# Patient Record
Sex: FEMALE | HISPANIC OR LATINO | Employment: PART TIME | URBAN - METROPOLITAN AREA
[De-identification: names, ages, dates, MRNs, and addresses within clinical notes are randomized per-mention and may not be internally consistent; named-entity substitution may affect disease eponyms.]

---

## 2022-11-16 ENCOUNTER — GYNECOLOGY VISIT (OUTPATIENT)
Dept: OBGYN | Facility: CLINIC | Age: 42
End: 2022-11-16

## 2022-11-16 VITALS
HEIGHT: 67 IN | BODY MASS INDEX: 34.06 KG/M2 | WEIGHT: 217 LBS | DIASTOLIC BLOOD PRESSURE: 62 MMHG | SYSTOLIC BLOOD PRESSURE: 112 MMHG

## 2022-11-16 DIAGNOSIS — N93.8 DUB (DYSFUNCTIONAL UTERINE BLEEDING): ICD-10-CM

## 2022-11-16 DIAGNOSIS — O09.521 ADVANCED MATERNAL AGE IN MULTIGRAVIDA, FIRST TRIMESTER: ICD-10-CM

## 2022-11-16 LAB
INT CON NEG: NEGATIVE
INT CON POS: POSITIVE
POC URINE PREGNANCY TEST: POSITIVE

## 2022-11-16 PROCEDURE — 81025 URINE PREGNANCY TEST: CPT | Performed by: PHYSICIAN ASSISTANT

## 2022-11-16 PROCEDURE — 99203 OFFICE O/P NEW LOW 30 MIN: CPT | Performed by: PHYSICIAN ASSISTANT

## 2022-11-16 ASSESSMENT — ENCOUNTER SYMPTOMS
NAUSEA: 0
HEADACHES: 1
VOMITING: 0
DEPRESSION: 0

## 2022-11-16 NOTE — PROGRESS NOTES
Patient here for GYN/DUB  LMP: 8/20/2022  MIRIAM: 5/27/2022  GA: 12w4d  Last pap: 2 yrs ago   Pt states having headaches and some cramping.   UPT positive

## 2022-11-16 NOTE — PROGRESS NOTES
"Subjective     Deneen Richard is a 42 y.o. female who presents with Gynecologic Exam (DUB)  Pt is somewhat sure of LMP. Dating is by US c/w LMP today, only 4 days different.   OBHx: Hx 3 term  without GDM, PIH or delivery complications, though last was .   PMHx: Denies  SHX: Denies  Allergies: NKDA  Social: Denies tob, etoh or drug use   Meds; Taking PNV only  Pt currently denies cramping, bleeding or pain though pt has had HA and mild cramping in lower abd - pt only drinking 2 bottles water daily, so urged to incr water intake. No FM.             HPI    Review of Systems   Gastrointestinal:  Negative for nausea and vomiting.   Neurological:  Positive for headaches.   Psychiatric/Behavioral:  Negative for depression.    All other systems reviewed and are negative.           Objective     /62   Ht 5' 7\"   Wt 217 lb   LMP 2022   BMI 33.99 kg/m²      Physical Exam  Vitals reviewed.   Constitutional:       Appearance: She is well-developed.   HENT:      Head: Normocephalic and atraumatic.   Eyes:      Pupils: Pupils are equal, round, and reactive to light.   Neck:      Thyroid: No thyromegaly.   Cardiovascular:      Rate and Rhythm: Normal rate and regular rhythm.      Heart sounds: Normal heart sounds.   Pulmonary:      Effort: Pulmonary effort is normal. No respiratory distress.      Breath sounds: Normal breath sounds.   Abdominal:      General: Bowel sounds are normal. There is no distension.      Palpations: Abdomen is soft.      Tenderness: There is no abdominal tenderness.   Genitourinary:     Exam position: Supine.      Uterus: Enlarged (Gravid, uterus c/w 12-13 wk size). Not deviated and not tender.    Musculoskeletal:      Cervical back: Normal range of motion and neck supple.   Skin:     General: Skin is warm and dry.      Findings: No erythema.   Neurological:      Mental Status: She is alert.      Deep Tendon Reflexes: Reflexes are normal and symmetric.   Psychiatric:    "      Behavior: Behavior normal.         Thought Content: Thought content normal.             BSUS done with single viable IUP, CRL c/w 13w1d, MIRIAM 5/23/23, +FHT at 155bpm, +FM. I would recommend keeping MIRIAM based on LMP of 5/27/23, as only 4 days different, would not change by guidelines.               Assessment & Plan        1. DUB (dysfunctional uterine bleeding)  - NOB visit 1-2 wks  - POCT Pregnancy    2. Advanced maternal age in multigravida, first trimester  - D/w pt risks and likely need/recommendation for NIPT next visit, pt receptive

## 2022-11-17 ENCOUNTER — APPOINTMENT (OUTPATIENT)
Dept: OBGYN | Facility: CLINIC | Age: 42
End: 2022-11-17

## 2022-12-05 ENCOUNTER — INITIAL PRENATAL (OUTPATIENT)
Dept: OBGYN | Facility: CLINIC | Age: 42
End: 2022-12-05

## 2022-12-05 ENCOUNTER — HOSPITAL ENCOUNTER (OUTPATIENT)
Facility: MEDICAL CENTER | Age: 42
End: 2022-12-05
Payer: MEDICAID

## 2022-12-05 VITALS
SYSTOLIC BLOOD PRESSURE: 110 MMHG | WEIGHT: 217.4 LBS | DIASTOLIC BLOOD PRESSURE: 66 MMHG | HEIGHT: 65 IN | BODY MASS INDEX: 36.22 KG/M2

## 2022-12-05 DIAGNOSIS — O09.522 AMA (ADVANCED MATERNAL AGE) MULTIGRAVIDA 35+, SECOND TRIMESTER: ICD-10-CM

## 2022-12-05 DIAGNOSIS — Z87.59 HISTORY OF DELIVERY OF MACROSOMAL INFANT: ICD-10-CM

## 2022-12-05 DIAGNOSIS — O09.892 SUPERVISION OF OTHER HIGH RISK PREGNANCIES, SECOND TRIMESTER: ICD-10-CM

## 2022-12-05 PROBLEM — N93.8 DUB (DYSFUNCTIONAL UTERINE BLEEDING): Status: RESOLVED | Noted: 2022-11-16 | Resolved: 2022-12-05

## 2022-12-05 LAB
APPEARANCE UR: CLEAR
BILIRUB UR STRIP-MCNC: NORMAL MG/DL
COLOR UR AUTO: NORMAL
GLUCOSE UR STRIP.AUTO-MCNC: NEGATIVE MG/DL
KETONES UR STRIP.AUTO-MCNC: NEGATIVE MG/DL
LEUKOCYTE ESTERASE UR QL STRIP.AUTO: NEGATIVE
NITRITE UR QL STRIP.AUTO: NEGATIVE
PH UR STRIP.AUTO: 5.5 [PH] (ref 5–8)
PROT UR QL STRIP: NEGATIVE MG/DL
RBC UR QL AUTO: NORMAL
SP GR UR STRIP.AUTO: 1.03
UROBILINOGEN UR STRIP-MCNC: NORMAL MG/DL

## 2022-12-05 PROCEDURE — 90471 IMMUNIZATION ADMIN: CPT

## 2022-12-05 PROCEDURE — 90686 IIV4 VACC NO PRSV 0.5 ML IM: CPT

## 2022-12-05 PROCEDURE — 81002 URINALYSIS NONAUTO W/O SCOPE: CPT

## 2022-12-05 PROCEDURE — 0500F INITIAL PRENATAL CARE VISIT: CPT

## 2022-12-05 ASSESSMENT — EDINBURGH POSTNATAL DEPRESSION SCALE (EPDS)
I HAVE BEEN SO UNHAPPY THAT I HAVE BEEN CRYING: NO, NEVER
TOTAL SCORE: 6
THE THOUGHT OF HARMING MYSELF HAS OCCURRED TO ME: NEVER
I HAVE BLAMED MYSELF UNNECESSARILY WHEN THINGS WENT WRONG: YES, MOST OF THE TIME
I HAVE BEEN ABLE TO LAUGH AND SEE THE FUNNY SIDE OF THINGS: AS MUCH AS I ALWAYS COULD
I HAVE FELT SCARED OR PANICKY FOR NO GOOD REASON: NO, NOT AT ALL
THINGS HAVE BEEN GETTING ON TOP OF ME: YES, MOST OF THE TIME I HAVEN'T BEEN ABLE TO COPE AT ALL
I HAVE FELT SAD OR MISERABLE: NO, NOT AT ALL
I HAVE BEEN ANXIOUS OR WORRIED FOR NO GOOD REASON: NO, NOT AT ALL
I HAVE BEEN SO UNHAPPY THAT I HAVE HAD DIFFICULTY SLEEPING: NOT AT ALL
I HAVE LOOKED FORWARD WITH ENJOYMENT TO THINGS: AS MUCH AS I EVER DID

## 2022-12-05 NOTE — PROGRESS NOTES
NOB visit  LMP: 08/20/2022  Pt had US   WT: 217.4 lb  BP: 110/66  Preferred pharmacy vitrified with pt.  Desires the influenza   Desires AMA Counseling  Pt desires AFP Screening   Pt states having sometimes she gets lower abdominal cramping and urge to void. States no other complaints or concerns today  Last pap: 2 yrs Negative   Good # [phone]

## 2022-12-05 NOTE — PROGRESS NOTES
"CC: New Ob visit     Subjective Deneen Richard  15w2d by LMP= 13w1d US presents for prenatal care. Today is her first prenatal visit at [facility].   I have reviewed the patients' medical, surgical, gynecological, obstetrical, social, and family histories, medications and available lab results and pertinent notes are as follows:   No ER visits  No previous care in this pregnancy.   She has complaints of cramping and some urinary urgency and frequency today.    Genetic screening options: Desires AFP.  Declines carrier screening. Declines NIPTs at this time but would consider it if Dr. Hinson recommends.     Reports no FM. Denies VB, LOF, or cramping.  Denies dysuria, vaginal DC.    Pt is unmarried and lives with FOB \"Hal\" and older kids. New FOB, older kids with prior partner. FOB is involved and supportive. She is not currently working outside the home, no heavy lifting, no chemical exposure.    Pregnancy is planned and desired.      OB History    Para Term  AB Living   4 3 3 0 0 3   SAB IAB Ectopic Molar Multiple Live Births   0 0 0 0 0 3       Past Gynecological History:  Denies fibroids, ovarian cysts, abnormal pap smears, STDs. She denies ever having surgery on her cervix, uterus, or ovaries in the past.    Last pap smear was 2 years ago with no hx abnormals, no records available so repeat today  Past OB hx includes 3 prior term NSVDs, 1 baby 8lb 15oz.   History of HSV I or II in self or partner: no  History of STIs in self or partner: no  History of Thyroid problems: no    History of depression or anxiety no      Past Medical History:   Diagnosis Date    GERD (gastroesophageal reflux disease)     Head ache      History reviewed. No pertinent surgical history.   Social History     Socioeconomic History    Marital status: Single     Spouse name: Not on file    Number of children: Not on file    Years of education: Not on file    Highest education level: Not on file "   Occupational History    Not on file   Tobacco Use    Smoking status: Never    Smokeless tobacco: Never   Vaping Use    Vaping Use: Never used   Substance and Sexual Activity    Alcohol use: Not Currently    Drug use: Never    Sexual activity: Yes     Partners: Male     Birth control/protection: None     Comment: not . planned pregnancy   Other Topics Concern    Not on file   Social History Narrative    Not on file     Social Determinants of Health     Financial Resource Strain: Not on file   Food Insecurity: Not on file   Transportation Needs: Not on file   Physical Activity: Not on file   Stress: Not on file   Social Connections: Not on file   Intimate Partner Violence: Not on file   Housing Stability: Not on file     Family History:   Family History   Problem Relation Age of Onset    No Known Problems Mother     No Known Problems Father     No Known Problems Sister     No Known Problems Brother        Genetic Screening/Teratology Counseling- Includes patient, baby's father, or anyone in either family with:  Patient's age 35 years or older as of estimated date of delivery: Yes (Comment: Desires AMA Counseling)     Thalassemia (Italian, Greek, Mediterranean, or  background): MCV less than 80: No     Neural tube defect (Meningomyelocele, Spina bifida, or Anencephaly): No     Congenital heart defect: No     Down syndrome: No     [place]-Sachs (Ashkenazi Yazdanism, Cajun, Hungarian Garza): No     Canavan disease (Ashkenazi Yazdanism): No     Familial dysautonomia (Ashkenazi Yazdanism): No     Sickle cell disease or trait (): No     Hemophilia or other blood disorders: No     Muscular dystrophy: No    Cystic fibrosis: No     A[place]a [place]'s chorea: No     Mental retardation/autism: No     Other inherited genetic or chromosomal disorder: No     Maternal metabolic disorder (eg. Type 1 diabetes, PKU): No     Patient or baby's father had child with birth defects not listed above: No     Recurrent pregnancy loss, or a  "stillbirth: No     Medications (including supplements, vitamins, herbs, or OTC drugs)/illicit/recreational drugs/alcohol since last menstrual period: No             Infection Prevention  1. High Risk For HIV: No 6. Rash Or Illness Since LMP: No     2. High Risk For Hepatitis B or C: No 7. History Of STD, GC, Chlamydia, HPV Syphilis: No     3. Live With Someone With TB Or Exposed To TB: No 8. Have a cat in the home?: No     4. Patient Or Partner Has A History Of Herpes: No      5. History of Chicken Pox: Yes (Comment: As a child) 9. Other (See Comments Below): No   Comments: Planned pregnancy  FOB involved and supportive. Not  but living together. Not the same father as the other 3 children. Pt stays at home.            Objective:   Allergies: Patient has no known allergies.  Objective:/66   Ht 5' 5.35\"   Wt 217 lb 6.4 oz      Prenatal Physical:  General Exam:  HEENT: normal    Heart: normal    Thyroid: normal    Lungs: normal    Lymph Nodes: normal    Neurological: normal    Abdomen: normal    Skin: normal    Extremities: normal    Pelvic Exam:  Vulva:  Normal  Vagina:  Normal  Cervix:  Normal  Uterus (wks):  15     Lab:   Recent Results (from the past 672 hour(s))   POCT Pregnancy    Collection Time: 11/16/22  9:19 AM   Result Value Ref Range    POC Urine Pregnancy Test positive Negative    Internal Control Positive Positive     Internal Control Negative Negative    POCT Urinalysis    Collection Time: 12/05/22 10:20 AM   Result Value Ref Range    POC Color Argelia Negative    POC Appearance Clear Negative    POC Leukocyte Esterase Negative Negative    POC Nitrites Negative Negative    POC Urobiligen n/a Negative (0.2) mg/dL    POC Protein Negative Negative mg/dL    POC Urine PH 5.5 5.0 - 8.0    POC Blood Moderate Negative    POC Specific Gravity 1.030 <1.005 - >1.030    POC Ketones Negative Negative mg/dL    POC Bilirubin n/a Negative mg/dL    POC Glucose Negative Negative mg/dL       Ultrasound:  " Reviewed initial scan and MIRIAM is by LMP c/w 13w1d US    Assessment:  --Normal Exam at 15 weeks 2 days  --Size consistent with dates  --FHR positive  --UA negative for signs of infection  Encounter Diagnoses   Name Primary?    AMA (advanced maternal age) multigravida 35+, second trimester     Supervision of other high risk pregnancies, second trimester     History of delivery of macrosomal infant         Plan:  Reviewed the patients risk factors for this pregnancy and recommend the need for   Complete OB US in: 5 weeks, ordered here but may cancel and have done at Dr. Hinson's office  Additional labs/imaging: early 1hr GTT for BMI and hx macrosomal infant  Antepartum fetal monitoring requirements: AMA monitoring  MFM referral for AMA counseling  Start ASA 81mg daily now for BMI and AMA  2. Genetic screening was discussed with literature on Prenatal Screening, Cystic Fibrosis, and SMA provided and the patient plans to:  - accepted MSAFP  - declined carrier testing  - declined NIPTs  3. Discuss importance of adequate water intake, taking PNV, healthy nutritional choices, and avoidence of ETOH/Tobacco/drugs  4. Discuss importance of exercise, as well as rest   5. If has nausea, take Vitamin B6 25mg TID with doxylamine/Unisom 25mg at night  6. Prenatal labs and UDS ordered - lab slip given  7. Discussed OB care at [facility] including midwifery care, group practice, and call schedules  8. GC/CT via pap today.    9. Pap collected today, no history of abnormal paps.  10. Pregnancy guide provided and reviewed safe medications in pregnancy page  11. Follow up in  4 weeks for next visit and PRN      Amy Duenas C.N.M.

## 2022-12-06 DIAGNOSIS — O09.892 SUPERVISION OF OTHER HIGH RISK PREGNANCIES, SECOND TRIMESTER: ICD-10-CM

## 2022-12-07 LAB
C TRACH DNA GENITAL QL NAA+PROBE: NEGATIVE
CYTOLOGY REG CYTOL: NORMAL
HPV HR 12 DNA CVX QL NAA+PROBE: NEGATIVE
HPV16 DNA SPEC QL NAA+PROBE: NEGATIVE
HPV18 DNA SPEC QL NAA+PROBE: NEGATIVE
N GONORRHOEA DNA GENITAL QL NAA+PROBE: NEGATIVE
SPECIMEN SOURCE: NORMAL
SPECIMEN SOURCE: NORMAL

## 2022-12-08 ENCOUNTER — ROUTINE PRENATAL (OUTPATIENT)
Dept: OBGYN | Facility: CLINIC | Age: 42
End: 2022-12-08

## 2022-12-08 ENCOUNTER — TELEPHONE (OUTPATIENT)
Dept: OBGYN | Facility: CLINIC | Age: 42
End: 2022-12-08
Payer: MEDICAID

## 2022-12-08 VITALS — WEIGHT: 222.2 LBS | BODY MASS INDEX: 36.58 KG/M2 | DIASTOLIC BLOOD PRESSURE: 66 MMHG | SYSTOLIC BLOOD PRESSURE: 108 MMHG

## 2022-12-08 DIAGNOSIS — O09.892 SUPERVISION OF OTHER HIGH RISK PREGNANCIES, SECOND TRIMESTER: Primary | ICD-10-CM

## 2022-12-08 PROCEDURE — 0502F SUBSEQUENT PRENATAL CARE: CPT | Performed by: NURSE PRACTITIONER

## 2022-12-08 NOTE — TELEPHONE ENCOUNTER
Pt called in stating that she had a pap smear at her last appointment on 12/05/2022, and since that pap smear she has been spotting. Spotting not enough to fill a pad, however it will be there when she wipes and is on her underwear. Offered the patient an appointment for today at 2:45 and she accepts, pt given bleeding precautions. No further questions.

## 2022-12-19 ENCOUNTER — HOSPITAL ENCOUNTER (OUTPATIENT)
Dept: LAB | Facility: MEDICAL CENTER | Age: 42
End: 2022-12-19
Payer: MEDICAID

## 2022-12-19 DIAGNOSIS — Z87.59 HISTORY OF DELIVERY OF MACROSOMAL INFANT: ICD-10-CM

## 2022-12-19 DIAGNOSIS — O09.522 AMA (ADVANCED MATERNAL AGE) MULTIGRAVIDA 35+, SECOND TRIMESTER: ICD-10-CM

## 2022-12-19 DIAGNOSIS — O09.892 SUPERVISION OF OTHER HIGH RISK PREGNANCIES, SECOND TRIMESTER: ICD-10-CM

## 2022-12-19 LAB
ABO GROUP BLD: NORMAL
BLD GP AB SCN SERPL QL: NORMAL
ERYTHROCYTE [DISTWIDTH] IN BLOOD BY AUTOMATED COUNT: 47.3 FL (ref 35.9–50)
GLUCOSE 1H P 50 G GLC PO SERPL-MCNC: 119 MG/DL (ref 70–139)
HBV SURFACE AG SER QL: ABNORMAL
HCT VFR BLD AUTO: 35.8 % (ref 37–47)
HCV AB SER QL: ABNORMAL
HGB BLD-MCNC: 11.4 G/DL (ref 12–16)
HIV 1+2 AB+HIV1 P24 AG SERPL QL IA: NORMAL
MCH RBC QN AUTO: 29.8 PG (ref 27–33)
MCHC RBC AUTO-ENTMCNC: 31.8 G/DL (ref 33.6–35)
MCV RBC AUTO: 93.5 FL (ref 81.4–97.8)
PLATELET # BLD AUTO: 236 K/UL (ref 164–446)
PMV BLD AUTO: 10.6 FL (ref 9–12.9)
RBC # BLD AUTO: 3.83 M/UL (ref 4.2–5.4)
RH BLD: NORMAL
RUBV AB SER QL: 9.03 IU/ML
T PALLIDUM AB SER QL IA: ABNORMAL
WBC # BLD AUTO: 7.3 K/UL (ref 4.8–10.8)

## 2022-12-20 ENCOUNTER — TELEPHONE (OUTPATIENT)
Dept: OBGYN | Facility: CLINIC | Age: 42
End: 2022-12-20
Payer: MEDICAID

## 2022-12-20 PROBLEM — R87.612 LGSIL ON PAP SMEAR OF CERVIX: Status: ACTIVE | Noted: 2022-12-20

## 2022-12-20 NOTE — TELEPHONE ENCOUNTER
----- Message from Amy Duenas C.N.M. sent at 12/20/2022 11:36 AM PST -----  Pap was slightly abnormal with low grade squamous epithelial cells. We recommend repeat in 1 year per [facility] to ensure the cells have cleared. Please let her know.       12/20/22  1448 Left message for pt to call back regarding pap results.   1459 pt called and notified as above. Pt agreed and verbalized understanding.

## 2022-12-21 LAB
# FETUSES US: NORMAL
AFP MOM SERPL: 1.33
AFP SERPL-MCNC: 42 NG/ML
AGE - REPORTED: 40.7 YR
AMPHET CTO UR CFM-MCNC: NEGATIVE NG/ML
BACTERIA UR CULT: NORMAL
BARBITURATES CTO UR CFM-MCNC: NEGATIVE NG/ML
BENZODIAZ CTO UR CFM-MCNC: NEGATIVE NG/ML
CANNABINOIDS CTO UR CFM-MCNC: NEGATIVE NG/ML
COCAINE CTO UR CFM-MCNC: NEGATIVE NG/ML
CURRENT SMOKER: NO
DRUG COMMENT 753798: NORMAL
FAMILY MEMBER DISEASES HX: NO
GA METHOD: NORMAL
GA: NORMAL WK
HCG MOM SERPL: 0.76
HCG SERPL-ACNC: NORMAL IU/L
HX OF HEREDITARY DISORDERS: NO
IDDM PATIENT QL: NO
INHIBIN A MOM SERPL: 1.22
INHIBIN A SERPL-MCNC: 164 PG/ML
INTEGRATED SCN PATIENT-IMP: NORMAL
METHADONE CTO UR CFM-MCNC: NEGATIVE NG/ML
OPIATES CTO UR CFM-MCNC: NEGATIVE NG/ML
PATHOLOGY STUDY: NORMAL
PCP CTO UR CFM-MCNC: NEGATIVE NG/ML
PROPOXYPH CTO UR CFM-MCNC: NEGATIVE NG/ML
SIGNIFICANT IND 70042: NORMAL
SITE SITE: NORMAL
SOURCE SOURCE: NORMAL
SPECIMEN DRAWN SERPL: NORMAL
U ESTRIOL MOM SERPL: 0.81
U ESTRIOL SERPL-MCNC: 0.99 NG/ML

## 2022-12-29 ENCOUNTER — ROUTINE PRENATAL (OUTPATIENT)
Dept: OBGYN | Facility: CLINIC | Age: 42
End: 2022-12-29

## 2022-12-29 VITALS — WEIGHT: 222 LBS | DIASTOLIC BLOOD PRESSURE: 70 MMHG | BODY MASS INDEX: 36.55 KG/M2 | SYSTOLIC BLOOD PRESSURE: 104 MMHG

## 2022-12-29 DIAGNOSIS — O09.522 AMA (ADVANCED MATERNAL AGE) MULTIGRAVIDA 35+, SECOND TRIMESTER: Primary | ICD-10-CM

## 2022-12-29 PROCEDURE — 0502F SUBSEQUENT PRENATAL CARE: CPT | Performed by: NURSE PRACTITIONER

## 2022-12-29 NOTE — PROGRESS NOTES
OB follow up   No fetal movement yet.  No VB, LOF or UC's.  Phone # [phone]  Preferred pharmacy confirmed.  PNP done. US scheduled for 1/9/23  Dr. Hinson no appt schedule yet. Patient states they never answered.

## 2023-01-26 ENCOUNTER — ROUTINE PRENATAL (OUTPATIENT)
Dept: OBGYN | Facility: CLINIC | Age: 43
End: 2023-01-26

## 2023-01-26 VITALS — DIASTOLIC BLOOD PRESSURE: 68 MMHG | BODY MASS INDEX: 37.73 KG/M2 | SYSTOLIC BLOOD PRESSURE: 104 MMHG | WEIGHT: 229.2 LBS

## 2023-01-26 DIAGNOSIS — R42 DIZZINESS: ICD-10-CM

## 2023-01-26 DIAGNOSIS — O09.92 SUPERVISION OF HIGH RISK PREGNANCY IN SECOND TRIMESTER: ICD-10-CM

## 2023-01-26 PROCEDURE — 0502F SUBSEQUENT PRENATAL CARE: CPT

## 2023-01-26 NOTE — PROGRESS NOTES
S:   Deneen is a 42 y.o.  at 22w5d. Her MIRIAM is 2023, by Last Menstrual Period. She is here for routine OB follow up.  Reports positive FM.  Denies VB, LOF, RUCs or vaginal DC. Reports feeling dizzy, even despite laying down. Drinks 2-3 bottles of water today (small bottles). Appt with Dr. Hinson on  or  - needs to confirm.     Current Outpatient Medications on File Prior to Visit   Medication Sig Dispense Refill    Prenatal MV-Min-Fe Fum-FA-DHA (PRENATAL 1 PO) Take  by mouth.      Aspirin 81 MG Cap Take 81 mg by mouth every day. (Patient not taking: Reported on 2022) 60 Capsule 3     No current facility-administered medications on file prior to visit.         O:    Vitals:    23 0852   BP: 104/68   Weight: 229 lb 3.2 oz       FH: 22 cm  FHT: 160 BPM    See flow sheet.    A:   IUP 22w5d  S=D  Patient Active Problem List    Diagnosis Date Noted    LGSIL on Pap smear of cervix, needs repeat 2022    Supervision of other high risk pregnancies, second trimester 2022    History of delivery of macrosomal infant 2022    AMA (advanced maternal age) multigravida 35+, second trimester 2022       P:    Questions answered. Anticipatory guidance.  Encouraged adequate water intake - 3-4 L/day  Anatomy US scheduled for 23 but was cancelled today  Appt with Dr. Hinson on  or   CBC ordered for dizziness   labor and return precautions reviewed - patient verbalized understanding.  F/u 4 wks and PRN    Orders Placed This Encounter    CBC WITHOUT DIFFERENTIAL        Pregnancy Checklist  Prenatal labs: PNL WNL  EPDS: not completed at NOB  Last Pap: 22: LSIL, HPV neg --> repeat in 1 year  Quad screen/NIPT/MASFP/Carrier Screen: quad screen neg  Anatomy US: was scheduled for 23, but was cancelled  3rd trimester labs:  Tdap:  Flu vaccine: administered 22  Rhogam: N/A O+  PP Contraception plan:  Bilateral salpingectomy:  GBS:   Hx C/S: no  Desires TOLAC?:  N/A  IOL plan:    Karina Lucas C.N.M.     Ipad  used: Donny [id number]

## 2023-02-22 NOTE — PROGRESS NOTES
S:   Deneen is a 42 y.o.  at 26w5d. Her MIRIAM is 2023, by Last Menstrual Period. She is here for routine OB follow up.  Reports positive FM.  Denies VB, LOF, RUCs or vaginal DC. Reports sx of GERD. Has not tried anything to make it better.    Current Outpatient Medications on File Prior to Visit   Medication Sig Dispense Refill    Aspirin 81 MG Cap Take 81 mg by mouth every day. (Patient not taking: Reported on 2022) 60 Capsule 3    Prenatal MV-Min-Fe Fum-FA-DHA (PRENATAL 1 PO) Take  by mouth.       No current facility-administered medications on file prior to visit.       O:    Vitals:    23 0848   BP: 116/66   Weight: 234 lb 9.6 oz       FH: 27 cm   FHT: 147 BPM    See flow sheet.      A:   IUP 26w5d  S=D  Patient Active Problem List    Diagnosis Date Noted    LGSIL on Pap smear of cervix, needs repeat 2022    Supervision of other high risk pregnancies, second trimester 2022    History of delivery of macrosomal infant 2022    AMA (advanced maternal age) multigravida 35+, second trimester 2022         P:    Questions answered. Anticipatory guidance.    Encourage adequate water intake.  Ordered 3T labs today - lab slip and instructions given  MFM update: reports from Dr. Hinson recommends growth US 4 weeks from last for size greater than dates - growth US ordered today for next available.  Recommend tums and lifestyle/diet changes for GERD sx.   labor and return precautions reviewed - patient verbalized understanding.  F/u 3 wks and PRN    Orders Placed This Encounter    US-OB LIMITED GROWTH FOLLOW UP    GLUCOSE 1HR GESTATIONAL    CBC WITHOUT DIFFERENTIAL    T.PALLIDUM AB HUMBERTO (SCREENING)       Pregnancy Checklist  Prenatal labs: PNL WNL  EPDS: not completed at NOB  Last Pap: 22: LSIL, HPV neg --> repeat in 1 year  Quad screen/NIPT/MASFP/Carrier Screen: quad screen neg  Anatomy US: awaiting records from Dr. Hinson's office - [place] to request  3rd trimester labs:  ordered today  Tdap:  Flu vaccine: administered 12/5/22  Rhogam: N/A O+  PP Contraception plan:   Bilateral salpingectomy:  GBS:   Hx C/S: no  IOL plan:    Karina Lucas C.N.M.

## 2023-02-23 ENCOUNTER — ROUTINE PRENATAL (OUTPATIENT)
Dept: OBGYN | Facility: CLINIC | Age: 43
End: 2023-02-23

## 2023-02-23 VITALS — SYSTOLIC BLOOD PRESSURE: 116 MMHG | WEIGHT: 234.6 LBS | BODY MASS INDEX: 38.62 KG/M2 | DIASTOLIC BLOOD PRESSURE: 66 MMHG

## 2023-02-23 DIAGNOSIS — O09.93 SUPERVISION OF HIGH RISK PREGNANCY IN THIRD TRIMESTER: ICD-10-CM

## 2023-02-23 PROCEDURE — 0502F SUBSEQUENT PRENATAL CARE: CPT

## 2023-02-23 NOTE — PROGRESS NOTES
Pt. Here for OB/FU. Reports Good FM.   Pt states had appt with Dr. Hinson on 2/2/2023 was not told if follow is needed.   Pt given 1 HR GTT, RPR and CBC lab slip today along with instructions.

## 2023-03-05 ENCOUNTER — APPOINTMENT (OUTPATIENT)
Dept: RADIOLOGY | Facility: MEDICAL CENTER | Age: 43
End: 2023-03-05
Payer: MEDICAID

## 2023-03-06 ENCOUNTER — HOSPITAL ENCOUNTER (OUTPATIENT)
Dept: RADIOLOGY | Facility: MEDICAL CENTER | Age: 43
End: 2023-03-06

## 2023-03-06 DIAGNOSIS — O09.93 SUPERVISION OF HIGH RISK PREGNANCY IN THIRD TRIMESTER: ICD-10-CM

## 2023-03-06 PROCEDURE — 76816 OB US FOLLOW-UP PER FETUS: CPT

## 2023-03-10 ENCOUNTER — HOSPITAL ENCOUNTER (OUTPATIENT)
Dept: LAB | Facility: MEDICAL CENTER | Age: 43
End: 2023-03-10

## 2023-03-10 DIAGNOSIS — O09.93 SUPERVISION OF HIGH RISK PREGNANCY IN THIRD TRIMESTER: ICD-10-CM

## 2023-03-10 LAB
ERYTHROCYTE [DISTWIDTH] IN BLOOD BY AUTOMATED COUNT: 46.1 FL (ref 35.9–50)
GLUCOSE 1H P 50 G GLC PO SERPL-MCNC: 155 MG/DL (ref 70–139)
HCT VFR BLD AUTO: 37.2 % (ref 37–47)
HGB BLD-MCNC: 12 G/DL (ref 12–16)
MCH RBC QN AUTO: 29.8 PG (ref 27–33)
MCHC RBC AUTO-ENTMCNC: 32.3 G/DL (ref 33.6–35)
MCV RBC AUTO: 92.3 FL (ref 81.4–97.8)
PLATELET # BLD AUTO: 212 K/UL (ref 164–446)
PMV BLD AUTO: 10.3 FL (ref 9–12.9)
RBC # BLD AUTO: 4.03 M/UL (ref 4.2–5.4)
WBC # BLD AUTO: 9.2 K/UL (ref 4.8–10.8)

## 2023-03-10 PROCEDURE — 85027 COMPLETE CBC AUTOMATED: CPT

## 2023-03-10 PROCEDURE — 36415 COLL VENOUS BLD VENIPUNCTURE: CPT

## 2023-03-10 PROCEDURE — 82950 GLUCOSE TEST: CPT

## 2023-03-10 PROCEDURE — 86780 TREPONEMA PALLIDUM: CPT

## 2023-03-11 LAB — T PALLIDUM AB SER QL IA: NORMAL

## 2023-03-12 DIAGNOSIS — E74.39 GLUCOSE INTOLERANCE: ICD-10-CM

## 2023-03-12 DIAGNOSIS — R73.02 GLUCOSE INTOLERANCE (IMPAIRED GLUCOSE TOLERANCE): ICD-10-CM

## 2023-03-13 ENCOUNTER — TELEPHONE (OUTPATIENT)
Dept: OBGYN | Facility: CLINIC | Age: 43
End: 2023-03-13

## 2023-03-13 NOTE — TELEPHONE ENCOUNTER
----- Message from Karina Lucas C.N.M. sent at 3/12/2023  3:47 PM PDT -----  Failed 1 hour GTT; 3 hour GTT Ordered; please have her complete ASAP and ensure she knows it is a fasting exam. Thank you.    03/13/23  1:43 PM   service used, ID #[id number]  Left message for pt to call back regarding results.     03/15/23  4:08 PM   service used, ID #[id number]  Left message for pt to call back regarding results.     4:17 PM  Patient notified of abnormal 1 hour gtt and the need to do the 3 hour gtt. Patient instructed to fast 10-12 hours prior to testing. Patient informed she should only drink water during fasting. Advised patient to bring a protein rich snack for after the test. Patient informed she will be in the lab for 3 hours. Patient verbalized understanding and did not have any other questions at this time. She is going to go tomorrow after appt. If not tomorrow then monday

## 2023-03-16 ENCOUNTER — HOSPITAL ENCOUNTER (OUTPATIENT)
Dept: LAB | Facility: MEDICAL CENTER | Age: 43
End: 2023-03-16
Payer: COMMERCIAL

## 2023-03-16 ENCOUNTER — ROUTINE PRENATAL (OUTPATIENT)
Dept: OBGYN | Facility: CLINIC | Age: 43
End: 2023-03-16

## 2023-03-16 VITALS — WEIGHT: 239.4 LBS | SYSTOLIC BLOOD PRESSURE: 108 MMHG | BODY MASS INDEX: 39.41 KG/M2 | DIASTOLIC BLOOD PRESSURE: 66 MMHG

## 2023-03-16 DIAGNOSIS — O09.522 AMA (ADVANCED MATERNAL AGE) MULTIGRAVIDA 35+, SECOND TRIMESTER: ICD-10-CM

## 2023-03-16 DIAGNOSIS — O09.892 SUPERVISION OF OTHER HIGH RISK PREGNANCIES, SECOND TRIMESTER: ICD-10-CM

## 2023-03-16 DIAGNOSIS — E74.39 GLUCOSE INTOLERANCE: ICD-10-CM

## 2023-03-16 LAB
GLUCOSE 1H P CHAL SERPL-MCNC: 140 MG/DL (ref 65–180)
GLUCOSE 2H P CHAL SERPL-MCNC: 111 MG/DL (ref 65–155)
GLUCOSE 3H P CHAL SERPL-MCNC: 107 MG/DL (ref 65–140)
GLUCOSE BS SERPL-MCNC: 76 MG/DL (ref 65–95)

## 2023-03-16 PROCEDURE — 0502F SUBSEQUENT PRENATAL CARE: CPT | Performed by: PHYSICIAN ASSISTANT

## 2023-03-16 NOTE — PROGRESS NOTES
Pt has no complaints with cramping, bleeding or pain. +FM - FKC sheet given today. 1hr GTT elevated, so pt to do 3hr GTT today, info given and pt to go after appt, as hasnt eaten any food yet. US for growth wnl, EFW 82%, c/w US by Dr Hinson - will continue to watch for growth and get another US prn. Declines TDaP but will get next visit, also will talk to partner about BTL for next visit. RTC 2 wk or sooner prn.

## 2023-03-16 NOTE — PROGRESS NOTES
Pt. Here for OB/F/U, Kick Count sheet given and explained to pt.   Good FM  Pt states no complaints  Pt states doing 3 HR GTT today.  Pt states wasn't told if she needed to follow up Dr. Joya Hercules offered and would like to read information.   BTL offered and would like to speak to her .

## 2023-04-03 ENCOUNTER — ROUTINE PRENATAL (OUTPATIENT)
Dept: OBGYN | Facility: CLINIC | Age: 43
End: 2023-04-03

## 2023-04-03 VITALS — BODY MASS INDEX: 40.01 KG/M2 | WEIGHT: 243 LBS | SYSTOLIC BLOOD PRESSURE: 124 MMHG | DIASTOLIC BLOOD PRESSURE: 80 MMHG

## 2023-04-03 DIAGNOSIS — O09.523 AMA (ADVANCED MATERNAL AGE) MULTIGRAVIDA 35+, THIRD TRIMESTER: ICD-10-CM

## 2023-04-03 PROCEDURE — 90715 TDAP VACCINE 7 YRS/> IM: CPT | Performed by: NURSE PRACTITIONER

## 2023-04-03 PROCEDURE — 90471 IMMUNIZATION ADMIN: CPT | Performed by: NURSE PRACTITIONER

## 2023-04-03 PROCEDURE — 0502F SUBSEQUENT PRENATAL CARE: CPT | Performed by: NURSE PRACTITIONER

## 2023-04-03 NOTE — PROGRESS NOTES
SUBJECTIVE:  Pt is a 42 y.o.   at 32w2d  gestation. Presents today for follow-up prenatal care. Reports no issues at this time.  Reports good  fetal movement. Denies regular cramping/contractions, bleeding or leaking of fluid. Denies dysuria, headaches, N/V. Generally feels well today except some round ligament discomfort and lower abdominal pressure.     OBJECTIVE:  - See prenatal vitals flow  -   Vitals:    23 0749   BP: 124/80   Weight: 243 lb                 ASSESSMENT:   - IUP at 32w2d    - S>D   -   Patient Active Problem List    Diagnosis Date Noted    Large for dates on Dr. Joya PHILIP (23) - F/U growth US ordered 23 per recommendation 2023    LGSIL on Pap smear of cervix, needs repeat 2022    History of delivery of macrosomal infant 2022    AMA (advanced maternal age) multigravida 35+, third trimester 2022         PLAN:  - S/sx pregnancy and labor warning signs vs general discomforts discussed  - Fetal movements and/or kick counts reviewed   - Adequate hydration reinforced  - Nutrition/exercise/vitamin education; continue PNV  - S/p Tdap vacc   - Desires BTL or IUD if no c/s  - Agreeable to IOL at 39 weeks and NSTs starting at 36 weeks  - Remedies for round ligament pain given   - Anticipatory guidance given  - RTC in 2 weeks for follow-up prenatal care      [At Term] : at term [Normal Vaginal Route] : by normal vaginal route [None] : there were no delivery complications

## 2023-04-03 NOTE — PROGRESS NOTES
Pt. Here for OB/FU. Reports Good FM.   Pt. Denies VB, LOF, or UC's.   Pt states she has no concerns today.   BTL signed today.   Tdap given today.   No  apts at this time.        ID : [id number]

## 2023-04-17 ENCOUNTER — ROUTINE PRENATAL (OUTPATIENT)
Dept: OBGYN | Facility: CLINIC | Age: 43
End: 2023-04-17

## 2023-04-17 VITALS — BODY MASS INDEX: 40.99 KG/M2 | DIASTOLIC BLOOD PRESSURE: 74 MMHG | WEIGHT: 249 LBS | SYSTOLIC BLOOD PRESSURE: 110 MMHG

## 2023-04-17 DIAGNOSIS — O09.523 AMA (ADVANCED MATERNAL AGE) MULTIGRAVIDA 35+, THIRD TRIMESTER: ICD-10-CM

## 2023-04-17 PROCEDURE — 0502F SUBSEQUENT PRENATAL CARE: CPT | Performed by: NURSE PRACTITIONER

## 2023-04-17 NOTE — PROGRESS NOTES
Pt. Here for OB/FU. Reports Good FM.   Pt. Denies VB, LOF, or UC's.   Pt states last night she had some cramps last night but they did not last long.            ID : [id number]

## 2023-04-17 NOTE — PROGRESS NOTES
SUBJECTIVE:  Pt is a 42 y.o.   at 34w2d  gestation. Presents today for follow-up prenatal care. Reports no issues at this time.  Reports good  fetal movement. Denies regular cramping/contractions, bleeding or leaking of fluid. Denies dysuria, headaches, N/V. Generally feels well today except some lower ligament discomfort and swelling in her ankles/legs.      OBJECTIVE:  - See prenatal vitals flow  -   Vitals:    23 1349   BP: 110/74   Weight: 249 lb                 ASSESSMENT:   - IUP at 34w2d    - S>D   -   Patient Active Problem List    Diagnosis Date Noted    Large for dates on Dr. Joya PHILIP (23) - F/U growth US ordered 23 per recommendation 2023    LGSIL on Pap smear of cervix, needs repeat 2022    History of delivery of macrosomal infant 2022    AMA (advanced maternal age) multigravida 35+, third trimester 2022         PLAN:  - S/sx pregnancy and labor warning signs vs general discomforts discussed  - Fetal movements and/or kick counts reviewed   - Adequate hydration reinforced  - Nutrition/exercise/vitamin education; continue PNV  - Plans unsure for contraception Pp: handout given and reviewed  - Growth US in 1-2 weeks  - Reviewed remedies for ligament and swelling and  labor monitoring   - Anticipatory guidance given  - RTC in 2 weeks for follow-up prenatal care

## 2023-05-01 ENCOUNTER — APPOINTMENT (OUTPATIENT)
Dept: RADIOLOGY | Facility: IMAGING CENTER | Age: 43
End: 2023-05-01
Attending: NURSE PRACTITIONER

## 2023-05-01 ENCOUNTER — ROUTINE PRENATAL (OUTPATIENT)
Dept: OBGYN | Facility: CLINIC | Age: 43
End: 2023-05-01

## 2023-05-01 ENCOUNTER — HOSPITAL ENCOUNTER (OUTPATIENT)
Facility: MEDICAL CENTER | Age: 43
End: 2023-05-01
Attending: NURSE PRACTITIONER
Payer: COMMERCIAL

## 2023-05-01 VITALS — SYSTOLIC BLOOD PRESSURE: 110 MMHG | DIASTOLIC BLOOD PRESSURE: 70 MMHG | WEIGHT: 251 LBS | BODY MASS INDEX: 41.32 KG/M2

## 2023-05-01 DIAGNOSIS — O09.523 AMA (ADVANCED MATERNAL AGE) MULTIGRAVIDA 35+, THIRD TRIMESTER: Primary | ICD-10-CM

## 2023-05-01 DIAGNOSIS — O09.523 AMA (ADVANCED MATERNAL AGE) MULTIGRAVIDA 35+, THIRD TRIMESTER: ICD-10-CM

## 2023-05-01 PROCEDURE — 76816 OB US FOLLOW-UP PER FETUS: CPT | Mod: TC | Performed by: NURSE PRACTITIONER

## 2023-05-01 PROCEDURE — 0502F SUBSEQUENT PRENATAL CARE: CPT | Performed by: NURSE PRACTITIONER

## 2023-05-01 RX ORDER — ASPIRIN/CALCIUM/MAG/ALUMINUM 325 MG
650 TABLET ORAL EVERY 4 HOURS PRN
COMMUNITY
End: 2023-05-10

## 2023-05-01 NOTE — PROGRESS NOTES
OB follow up   + fetal movement.  No VB, LOF or UC's.  Phone # [phone]  Preferred pharmacy confirmed.  GBS today

## 2023-05-02 LAB — GP B STREP DNA SPEC QL NAA+PROBE: NEGATIVE

## 2023-05-10 ENCOUNTER — HOSPITAL ENCOUNTER (INPATIENT)
Facility: MEDICAL CENTER | Age: 43
LOS: 2 days | End: 2023-05-12
Attending: STUDENT IN AN ORGANIZED HEALTH CARE EDUCATION/TRAINING PROGRAM | Admitting: STUDENT IN AN ORGANIZED HEALTH CARE EDUCATION/TRAINING PROGRAM
Payer: MEDICAID

## 2023-05-10 ENCOUNTER — ROUTINE PRENATAL (OUTPATIENT)
Dept: OBGYN | Facility: CLINIC | Age: 43
End: 2023-05-10

## 2023-05-10 VITALS — DIASTOLIC BLOOD PRESSURE: 78 MMHG | BODY MASS INDEX: 42.15 KG/M2 | SYSTOLIC BLOOD PRESSURE: 151 MMHG | WEIGHT: 256 LBS

## 2023-05-10 DIAGNOSIS — O09.523 AMA (ADVANCED MATERNAL AGE) MULTIGRAVIDA 35+, THIRD TRIMESTER: ICD-10-CM

## 2023-05-10 LAB
ALBUMIN SERPL BCP-MCNC: 3.1 G/DL (ref 3.2–4.9)
ALBUMIN/GLOB SERPL: 0.9 G/DL
ALP SERPL-CCNC: 175 U/L (ref 30–99)
ALT SERPL-CCNC: 7 U/L (ref 2–50)
ANION GAP SERPL CALC-SCNC: 10 MMOL/L (ref 7–16)
AST SERPL-CCNC: 14 U/L (ref 12–45)
BASOPHILS # BLD AUTO: 0.2 % (ref 0–1.8)
BASOPHILS # BLD AUTO: 0.2 % (ref 0–1.8)
BASOPHILS # BLD: 0.02 K/UL (ref 0–0.12)
BASOPHILS # BLD: 0.02 K/UL (ref 0–0.12)
BILIRUB SERPL-MCNC: 0.3 MG/DL (ref 0.1–1.5)
BUN SERPL-MCNC: 8 MG/DL (ref 8–22)
CALCIUM ALBUM COR SERPL-MCNC: 9.6 MG/DL (ref 8.5–10.5)
CALCIUM SERPL-MCNC: 8.9 MG/DL (ref 8.5–10.5)
CHLORIDE SERPL-SCNC: 107 MMOL/L (ref 96–112)
CO2 SERPL-SCNC: 20 MMOL/L (ref 20–33)
CREAT SERPL-MCNC: 0.5 MG/DL (ref 0.5–1.4)
CREAT UR-MCNC: 22.38 MG/DL
EOSINOPHIL # BLD AUTO: 0.02 K/UL (ref 0–0.51)
EOSINOPHIL # BLD AUTO: 0.02 K/UL (ref 0–0.51)
EOSINOPHIL NFR BLD: 0.2 % (ref 0–6.9)
EOSINOPHIL NFR BLD: 0.2 % (ref 0–6.9)
ERYTHROCYTE [DISTWIDTH] IN BLOOD BY AUTOMATED COUNT: 45.4 FL (ref 35.9–50)
ERYTHROCYTE [DISTWIDTH] IN BLOOD BY AUTOMATED COUNT: 45.8 FL (ref 35.9–50)
GFR SERPLBLD CREATININE-BSD FMLA CKD-EPI: 121 ML/MIN/1.73 M 2
GLOBULIN SER CALC-MCNC: 3.5 G/DL (ref 1.9–3.5)
GLUCOSE SERPL-MCNC: 72 MG/DL (ref 65–99)
HCT VFR BLD AUTO: 37.8 % (ref 37–47)
HCT VFR BLD AUTO: 38.2 % (ref 37–47)
HGB BLD-MCNC: 12.6 G/DL (ref 12–16)
HGB BLD-MCNC: 12.6 G/DL (ref 12–16)
HOLDING TUBE BB 8507: NORMAL
IMM GRANULOCYTES # BLD AUTO: 0.03 K/UL (ref 0–0.11)
IMM GRANULOCYTES # BLD AUTO: 0.04 K/UL (ref 0–0.11)
IMM GRANULOCYTES NFR BLD AUTO: 0.4 % (ref 0–0.9)
IMM GRANULOCYTES NFR BLD AUTO: 0.4 % (ref 0–0.9)
LYMPHOCYTES # BLD AUTO: 1.63 K/UL (ref 1–4.8)
LYMPHOCYTES # BLD AUTO: 1.94 K/UL (ref 1–4.8)
LYMPHOCYTES NFR BLD: 20.1 % (ref 22–41)
LYMPHOCYTES NFR BLD: 21 % (ref 22–41)
MCH RBC QN AUTO: 29.9 PG (ref 27–33)
MCH RBC QN AUTO: 30.3 PG (ref 27–33)
MCHC RBC AUTO-ENTMCNC: 33 G/DL (ref 33.6–35)
MCHC RBC AUTO-ENTMCNC: 33.3 G/DL (ref 33.6–35)
MCV RBC AUTO: 90.7 FL (ref 81.4–97.8)
MCV RBC AUTO: 90.9 FL (ref 81.4–97.8)
MONOCYTES # BLD AUTO: 0.64 K/UL (ref 0–0.85)
MONOCYTES # BLD AUTO: 0.72 K/UL (ref 0–0.85)
MONOCYTES NFR BLD AUTO: 7.8 % (ref 0–13.4)
MONOCYTES NFR BLD AUTO: 7.9 % (ref 0–13.4)
NEUTROPHILS # BLD AUTO: 5.75 K/UL (ref 2–7.15)
NEUTROPHILS # BLD AUTO: 6.5 K/UL (ref 2–7.15)
NEUTROPHILS NFR BLD: 70.4 % (ref 44–72)
NEUTROPHILS NFR BLD: 71.2 % (ref 44–72)
NRBC # BLD AUTO: 0 K/UL
NRBC # BLD AUTO: 0 K/UL
NRBC BLD-RTO: 0 /100 WBC
NRBC BLD-RTO: 0 /100 WBC
PLATELET # BLD AUTO: 193 K/UL (ref 164–446)
PLATELET # BLD AUTO: 195 K/UL (ref 164–446)
PMV BLD AUTO: 11.1 FL (ref 9–12.9)
PMV BLD AUTO: 11.3 FL (ref 9–12.9)
POTASSIUM SERPL-SCNC: 3.9 MMOL/L (ref 3.6–5.5)
PROT SERPL-MCNC: 6.6 G/DL (ref 6–8.2)
PROT UR-MCNC: 6 MG/DL (ref 0–15)
PROT/CREAT UR: 268 MG/G (ref 10–107)
RBC # BLD AUTO: 4.16 M/UL (ref 4.2–5.4)
RBC # BLD AUTO: 4.21 M/UL (ref 4.2–5.4)
SODIUM SERPL-SCNC: 137 MMOL/L (ref 135–145)
T PALLIDUM AB SER QL IA: NORMAL
WBC # BLD AUTO: 8.1 K/UL (ref 4.8–10.8)
WBC # BLD AUTO: 9.2 K/UL (ref 4.8–10.8)

## 2023-05-10 PROCEDURE — 10H07YZ INSERTION OF OTHER DEVICE INTO PRODUCTS OF CONCEPTION, VIA NATURAL OR ARTIFICIAL OPENING: ICD-10-PCS | Performed by: OBSTETRICS & GYNECOLOGY

## 2023-05-10 PROCEDURE — 84156 ASSAY OF PROTEIN URINE: CPT

## 2023-05-10 PROCEDURE — 10907ZC DRAINAGE OF AMNIOTIC FLUID, THERAPEUTIC FROM PRODUCTS OF CONCEPTION, VIA NATURAL OR ARTIFICIAL OPENING: ICD-10-PCS | Performed by: OBSTETRICS & GYNECOLOGY

## 2023-05-10 PROCEDURE — 700111 HCHG RX REV CODE 636 W/ 250 OVERRIDE (IP): Performed by: NURSE PRACTITIONER

## 2023-05-10 PROCEDURE — 82570 ASSAY OF URINE CREATININE: CPT

## 2023-05-10 PROCEDURE — 85025 COMPLETE CBC W/AUTO DIFF WBC: CPT | Mod: 91

## 2023-05-10 PROCEDURE — 36415 COLL VENOUS BLD VENIPUNCTURE: CPT

## 2023-05-10 PROCEDURE — 700111 HCHG RX REV CODE 636 W/ 250 OVERRIDE (IP)

## 2023-05-10 PROCEDURE — 86780 TREPONEMA PALLIDUM: CPT

## 2023-05-10 PROCEDURE — 0502F SUBSEQUENT PRENATAL CARE: CPT | Performed by: NURSE PRACTITIONER

## 2023-05-10 PROCEDURE — 59025 FETAL NON-STRESS TEST: CPT | Performed by: NURSE PRACTITIONER

## 2023-05-10 PROCEDURE — 700105 HCHG RX REV CODE 258: Performed by: STUDENT IN AN ORGANIZED HEALTH CARE EDUCATION/TRAINING PROGRAM

## 2023-05-10 PROCEDURE — 3E033VJ INTRODUCTION OF OTHER HORMONE INTO PERIPHERAL VEIN, PERCUTANEOUS APPROACH: ICD-10-PCS | Performed by: OBSTETRICS & GYNECOLOGY

## 2023-05-10 PROCEDURE — 80053 COMPREHEN METABOLIC PANEL: CPT

## 2023-05-10 PROCEDURE — 770002 HCHG ROOM/CARE - OB PRIVATE (112)

## 2023-05-10 PROCEDURE — 59025 FETAL NON-STRESS TEST: CPT

## 2023-05-10 RX ORDER — HYDRALAZINE HYDROCHLORIDE 20 MG/ML
5-10 INJECTION INTRAMUSCULAR; INTRAVENOUS
Status: DISCONTINUED | OUTPATIENT
Start: 2023-05-10 | End: 2023-05-11 | Stop reason: HOSPADM

## 2023-05-10 RX ORDER — SODIUM CHLORIDE, SODIUM LACTATE, POTASSIUM CHLORIDE, CALCIUM CHLORIDE 600; 310; 30; 20 MG/100ML; MG/100ML; MG/100ML; MG/100ML
INJECTION, SOLUTION INTRAVENOUS CONTINUOUS
Status: DISCONTINUED | OUTPATIENT
Start: 2023-05-10 | End: 2023-05-11

## 2023-05-10 RX ORDER — LABETALOL HYDROCHLORIDE 5 MG/ML
20-80 INJECTION, SOLUTION INTRAVENOUS
Status: DISCONTINUED | OUTPATIENT
Start: 2023-05-10 | End: 2023-05-11 | Stop reason: HOSPADM

## 2023-05-10 RX ORDER — TERBUTALINE SULFATE 1 MG/ML
0.25 INJECTION, SOLUTION SUBCUTANEOUS
Status: DISCONTINUED | OUTPATIENT
Start: 2023-05-10 | End: 2023-05-11 | Stop reason: HOSPADM

## 2023-05-10 RX ORDER — OXYTOCIN 10 [USP'U]/ML
10 INJECTION, SOLUTION INTRAMUSCULAR; INTRAVENOUS
Status: DISCONTINUED | OUTPATIENT
Start: 2023-05-10 | End: 2023-05-11 | Stop reason: HOSPADM

## 2023-05-10 RX ORDER — ACETAMINOPHEN 500 MG
1000 TABLET ORAL
Status: DISCONTINUED | OUTPATIENT
Start: 2023-05-10 | End: 2023-05-11 | Stop reason: HOSPADM

## 2023-05-10 RX ORDER — LIDOCAINE HYDROCHLORIDE 10 MG/ML
20 INJECTION, SOLUTION INFILTRATION; PERINEURAL
Status: DISCONTINUED | OUTPATIENT
Start: 2023-05-10 | End: 2023-05-11 | Stop reason: HOSPADM

## 2023-05-10 RX ORDER — NIFEDIPINE 10 MG/1
10 CAPSULE ORAL
Status: DISCONTINUED | OUTPATIENT
Start: 2023-05-10 | End: 2023-05-11 | Stop reason: HOSPADM

## 2023-05-10 RX ORDER — IBUPROFEN 800 MG/1
800 TABLET ORAL
Status: DISCONTINUED | OUTPATIENT
Start: 2023-05-10 | End: 2023-05-11 | Stop reason: HOSPADM

## 2023-05-10 RX ADMIN — OXYTOCIN 1 MILLI-UNITS/MIN: 10 INJECTION, SOLUTION INTRAMUSCULAR; INTRAVENOUS at 19:56

## 2023-05-10 RX ADMIN — SODIUM CHLORIDE, POTASSIUM CHLORIDE, SODIUM LACTATE AND CALCIUM CHLORIDE: 600; 310; 30; 20 INJECTION, SOLUTION INTRAVENOUS at 19:59

## 2023-05-10 RX ADMIN — FENTANYL CITRATE 100 MCG: 50 INJECTION, SOLUTION INTRAMUSCULAR; INTRAVENOUS at 23:58

## 2023-05-10 ASSESSMENT — PATIENT HEALTH QUESTIONNAIRE - PHQ9
1. LITTLE INTEREST OR PLEASURE IN DOING THINGS: NOT AT ALL
SUM OF ALL RESPONSES TO PHQ9 QUESTIONS 1 AND 2: 0
2. FEELING DOWN, DEPRESSED, IRRITABLE, OR HOPELESS: NOT AT ALL

## 2023-05-10 ASSESSMENT — LIFESTYLE VARIABLES
EVER_SMOKED: NEVER
ALCOHOL_USE: NO
CONSUMPTION TOTAL: NEGATIVE
TOTAL SCORE: 0
DOES PATIENT WANT TO STOP DRINKING: NO
HOW MANY TIMES IN THE PAST YEAR HAVE YOU HAD 5 OR MORE DRINKS IN A DAY: 0
EVER HAD A DRINK FIRST THING IN THE MORNING TO STEADY YOUR NERVES TO GET RID OF A HANGOVER: NO
EVER FELT BAD OR GUILTY ABOUT YOUR DRINKING: NO
ON A TYPICAL DAY WHEN YOU DRINK ALCOHOL HOW MANY DRINKS DO YOU HAVE: 0
HAVE PEOPLE ANNOYED YOU BY CRITICIZING YOUR DRINKING: NO
AVERAGE NUMBER OF DAYS PER WEEK YOU HAVE A DRINK CONTAINING ALCOHOL: 0
TOTAL SCORE: 0
TOTAL SCORE: 0
HAVE YOU EVER FELT YOU SHOULD CUT DOWN ON YOUR DRINKING: NO

## 2023-05-10 NOTE — PROGRESS NOTES
1316 43 y/o  EDC 23, EGA 37.4, here to L&D from the office for elevated BP. Using language line services  Stephen ([phone]) EFM/TOCO applied, pt states positive FM. Denies vaginal bleeding and LOF.     1330 Per Bre Oquendo run PIH workup     1420 Bre updated    1525 Bre updated about labs and BP's

## 2023-05-10 NOTE — PROGRESS NOTES
Report received from Stephie MARQUES.    Jere NÚÑEZ at bedside for US, vertex orientation confirmed.     Dr. Pacheco on unit, evalutes FHR strip. Orders for obstetric balloon. Dr. Pacheco at bedside for discussion of POC and placement of balloon, see LDA.    Report given to Anushka MARQUES, POC discussed and care relinquished.

## 2023-05-10 NOTE — PROGRESS NOTES
SUBJECTIVE:  Pt is a 42 y.o.   at 37w4d  gestation. Presents today for follow-up prenatal care. Reports no issues at this time.  Reports good  fetal movement. Denies regular cramping/contractions, bleeding or leaking of fluid. Denies dysuria, headaches, N/V. Generally feels well today except some lighter cramping and irregular contractions. Denies any headaches, changes in vision, RUQ, SOB or swelling.      OBJECTIVE:  - See prenatal vitals flow  -   Vitals:    05/10/23 1043   BP: (!) 144/98   Weight: 256 lb                 ASSESSMENT:   - IUP at 37w4d    - S=D   -   Patient Active Problem List    Diagnosis Date Noted    LGSIL on Pap smear of cervix, needs repeat 2022    History of delivery of macrosomal infant 2022    AMA (advanced maternal age) multigravida 35+, third trimester 2022         PLAN:  - S/sx pregnancy and labor warning signs vs general discomforts discussed  - Fetal movements and/or kick counts reviewed   - Adequate hydration reinforced  - Nutrition/exercise/vitamin education; continue PNV  - NST today for AMA: mildly elevated BP at 151/78; 144/80  - Growth US WNL  - IOL  at 39 weeks  - Anticipatory guidance given  - RTC in 1 weeks for follow-up prenatal care  - To LnD for Pre E labs now; Dr. Becerra advised

## 2023-05-10 NOTE — ED PROVIDER NOTES
PATIENT ID:  NAME:  Deneen Morales  MRN:               [id number]  YOB: 1980     42 y.o. female  at 37w4d.    Subjective: Pt sent from office for mildly elevated BPs.  Pregnancy complicated by AMA, hx of macrosomia     positive  For irreg CTXS.   negative Feels pain   negative for LOF  negative for vaginal bleeding.   positive for fetal movement    ROS: Patient denies any fever chills, nausea, vomiting, headache, chest pain, shortness of breath, or dysuria or unusual swelling of hands or feet.     Objective:    There were no vitals filed for this visit.  No data recorded.    BPs:   147/86, 137/75, 138/75, 169/72, 166/76/ 152/76    PIH labs: wnl    PCR: 268    General: No acute distress, resting comfortably in bed.  HEENT: normocephalic, nontraumatic, PERRLA, EOMI  Cardiovascular: Heart RRR with no murmurs, rubs or gallops. Distal Pulses 2+  Respiratory: symmetric chest expansion, lungs CTAB, with no wheezes, rales, rhonci  Abdomen: gravid, nontender  Musculoskeletal: strength 5/5 in four extremities  Neuro: non focal with no numbness, tingling or changes in sensation    Cervix:  1cm/th/posterior and high  [place]: Uterine Contractions Q3-6 minutes.   FHRM: Baseline 150, Accels to 170, no decels, moderate variability    Assessment: 42 y.o. female  at 37w4d.    Plan:   Patient to be admitted for IOL d/t TN

## 2023-05-10 NOTE — H&P
History and Physical      Deneen Morales is a 42 y.o. year old female  at 37w4d sent over from office for mildly elevated BPs.    Pregnancy complicated by AMA, hx of macrosomia    Subjective:   positive  For CTXS.   positive Feels pain   negative for LOF  negative for vaginal bleeding.   positive for fetal movement    ROS: Pertinent items are noted in HPI.    History reviewed. No pertinent past medical history.  History reviewed. No pertinent surgical history.  OB History    Para Term  AB Living   4 3 3     3   SAB IAB Ectopic Molar Multiple Live Births             3      # Outcome Date GA Lbr Jose/2nd Weight Sex Delivery Anes PTL Lv   4 Current            3 Term 05 39w0d  3.629 kg (8 lb) F Vag-Spont None N JORGE      Birth Comments: Pt states no complications.   2 Term 03 39w0d  3.997 kg (8 lb 13 oz) F Vag-Spont None N JORGE      Birth Comments: Pt states no complications.   1 Term 02 39w0d  3.8 kg (8 lb 6 oz) F Vag-Spont None N JORGE      Birth Comments: Pt states complications.     Social History     Socioeconomic History    Marital status: Single     Spouse name: Not on file    Number of children: Not on file    Years of education: Not on file    Highest education level: Not on file   Occupational History    Not on file   Tobacco Use    Smoking status: Never    Smokeless tobacco: Never   Vaping Use    Vaping Use: Never used   Substance and Sexual Activity    Alcohol use: Not Currently    Drug use: Never    Sexual activity: Yes     Partners: Male     Birth control/protection: None     Comment: not . planned pregnancy   Other Topics Concern    Not on file   Social History Narrative    Not on file     Social Determinants of Health     Financial Resource Strain: Not on file   Food Insecurity: Not on file   Transportation Needs: Not on file   Physical Activity: Not on file   Stress: Not on file   Social Connections: Not on file   Intimate Partner Violence: Not on  "file   Housing Stability: Not on file     Allergies: Patient has no known allergies.  No current facility-administered medications for this encounter.    [facility] with following problems:  Patient Active Problem List    Diagnosis Date Noted    LGSIL on Pap smear of cervix, needs repeat 12/2023 12/20/2022    History of delivery of macrosomal infant 12/05/2022    AMA (advanced maternal age) multigravida 35+, third trimester 11/16/2022         Objective:      BP (!) 152/76   Pulse 72   Temp 36.2 °C (97.2 °F) (Temporal)   Resp 18   Ht 1.676 m (5' 6\")   Wt 116 kg (256 lb)   SpO2 98%     General:   no acute distress   Skin:   normal   HEENT:  PERRLA   Lungs:   CTA bilateral   Heart:   S1, S2 normal, no murmur, click, rub or gallop, regular rate and rhythm, brisk carotid upstroke without bruits, peripheral pulses very brisk, chest is clear without rales or wheezing, no pedal edema, no JVD, no hepatosplenomegaly   Abdomen:   gravid, NT   EFW:  8551-2413 g   Pelvis:  Exam deferred., proven to 3997 g   FHTs: 150 BPM + accels - decels moderate variability   Contractions: Contractions q 3-6 min moderate to palpation   Uterine Size: S=D   Presentations: Cephalic presentation confirmed by BSUS   Cervix: Per RN    Dilation: 1cm    Effacement: 40%    Station:  -4    Consistency: Soft    Position: Middle     Complete OB US  5/1/2023 9:57 AM     HISTORY/REASON FOR EXAM:  History of macrosomia, AMA.     TECHNIQUE/EXAM DESCRIPTION: OB limited ultrasound.     COMPARISON:  3/6/2023 OB limited ultrasound.     FINDINGS:  Fetal Lie:  Vertex  LMP:  8/20/2022  Clinical MIRIAM by LMP:  5/27/2023     Placenta (Location):  Anterior  Placenta Previa: No  Placental ndGndrndanddndend:nd nd2nd Amniotic Fluid Volume:  OMARI = 10.93 cm     Fetal Heart Rate:  157 bpm     Cervical Length:  Not seen     No maternal adnexal mass is identified.     Fetal Biometry  BPD    8.88 cm, 35 weeks 2 days, (49.6%)  HC    32.37 cm, 36 weeks 4 day, (28.0%)  AC    " 32.05 cm, 36 weeks 0 days, (52.1%)  Femur Length    6.98 cm, 35 weeks 6 days, (33.6%)  Humerus Length    6.03 cm, 35 weeks 0 days, (36.5%)     EGA by this US:  35 weeks 6 days  MIRIAM by this US: 5/30/2023  MIRIAM by 1st US:  5/23/2023     Estimated Fetal Weight:  2820 grams  EFW Percentile: 44.3%     Comments:     IMPRESSION:     Single intrauterine pregnancy of an estimated gestational age of 35 weeks 6 days with an estimated date of delivery of 5/30/2023.     Complete fetal survey was not performed.    Lab Review  Lab:   Blood type: O     Recent Results (from the past 5880 hour(s))   POCT Pregnancy    Collection Time: 11/16/22  9:19 AM   Result Value Ref Range    POC Urine Pregnancy Test positive Negative    Internal Control Positive Positive     Internal Control Negative Negative    POCT Urinalysis    Collection Time: 12/05/22 10:20 AM   Result Value Ref Range    POC Color Argelia Negative    POC Appearance Clear Negative    POC Leukocyte Esterase Negative Negative    POC Nitrites Negative Negative    POC Urobiligen n/a Negative (0.2) mg/dL    POC Protein Negative Negative mg/dL    POC Urine PH 5.5 5.0 - 8.0    POC Blood Moderate Negative    POC Specific Gravity 1.030 <1.005 - >1.030    POC Ketones Negative Negative mg/dL    POC Bilirubin n/a Negative mg/dL    POC Glucose Negative Negative mg/dL   THINPREP PAP W/HPV AND CTNG    Collection Time: 12/05/22 11:26 AM   Result Value Ref Range    Cytology Reg See Path Report     Source Cervical     HPV Genotype 16 Negative Negative    HPV Genotype 18 Negative Negative    HPV Other High Risk Genotypes Negative Negative    C. trachomatis by PCR Negative Negative    N. gonorrhoeae by PCR Negative Negative    Source Cervical    GLUCOSE 1HR GESTATIONAL    Collection Time: 12/19/22  9:57 AM   Result Value Ref Range    Glucose, Post Dose 119 70 - 139 mg/dL   AFP TETRA    Collection Time: 12/19/22  9:58 AM   Result Value Ref Range    AFP Value -Eia 42 ng/mL    AFP MOM Value 1.33      Ue3 Value 0.99 ng/mL    Ue3 Mom 0.81     Patient's hCG, 2nd Trimester 75355 IU/L    hCG MoM, 2nd Trimester 0.76     Saranya Value -Eia 164 pg/mL    Saranya Mom Value 1.22     Interpretation Screen Neg     M[age] MIRIAM 42.7 yr    Maternal Weight 217.0 lbs.     Gest. Age on Collection Date 17 wks, 2 days     Gestational Age Based On Ultrasound     Multiple Pregnancy Hernandez     Race Unknown     Insulin Dependent Diabetes No     Smoking No     Family Hx NTD No     Family Hx of Aneuploidy No     Specimen See Note     EER Quad, Maternal Serum See Note    PREG CNTR PRENATAL PN    Collection Time: 12/19/22  9:58 AM   Result Value Ref Range    WBC 7.3 4.8 - 10.8 K/uL    RBC 3.83 (L) 4.20 - 5.40 M/uL    Hemoglobin 11.4 (L) 12.0 - 16.0 g/dL    Hematocrit 35.8 (L) 37.0 - 47.0 %    MCV 93.5 81.4 - 97.8 fL    MCH 29.8 27.0 - 33.0 pg    MCHC 31.8 (L) 33.6 - 35.0 g/dL    RDW 47.3 35.9 - 50.0 fL    Platelet Count 236 164 - 446 K/uL    MPV 10.6 9.0 - 12.9 fL    Hepatitis C Antibody Non-Reactive Non-Reactive    Hepatitis B Surface Antigen Non-Reactive Non-Reactive    Rubella IgG Antibody 9.03 IU/mL    Syphilis, Treponemal Qual Non-Reactive Non-Reactive   URINE CULTURE(NEW)    Collection Time: 12/19/22  9:58 AM    Specimen: Urine   Result Value Ref Range    Significant Indicator NEG     Source UR     Site Clean Catch     Culture Result Usual urogenital latanya 10-50,000 cfu/mL    HIV AG/AB COMBO ASSAY SCREENING    Collection Time: 12/19/22  9:58 AM   Result Value Ref Range    HIV Ag/Ab Combo Assay Non-Reactive Non Reactive   URINE DRUG SCREEN W/CONF (AR)    Collection Time: 12/19/22  9:58 AM   Result Value Ref Range    Urine Amphetamine-Methamphetam Negative Cutoff 300 ng/mL    Barbiturates Negative Cutoff 200 ng/mL    Benzodiazepines Negative Cutoff 200 ng/mL    Propoxyphene Negative Cutoff 300 ng/mL    Cocaine Metabolite Negative Cutoff 150 ng/mL    Methadone Negative Cutoff 150 ng/mL    Codeine-Morphine Negative Cutoff 300 ng/mL     Phencyclidine -Pcp Negative Cutoff 25 ng/mL    Cannabinoid Metab Negative Cutoff 20 ng/mL    Drug Comment Urine Drugs See Note    OP Prenatal Panel-Blood Bank    Collection Time: 12/19/22 10:01 AM   Result Value Ref Range    ABO Grouping Only O     Rh Grouping Only POS     Antibody Screen Scrn NEG    GLUCOSE 1HR GESTATIONAL    Collection Time: 03/10/23  1:25 PM   Result Value Ref Range    Glucose, Post Dose 155 (H) 70 - 139 mg/dL   CBC WITHOUT DIFFERENTIAL    Collection Time: 03/10/23  1:25 PM   Result Value Ref Range    WBC 9.2 4.8 - 10.8 K/uL    RBC 4.03 (L) 4.20 - 5.40 M/uL    Hemoglobin 12.0 12.0 - 16.0 g/dL    Hematocrit 37.2 37.0 - 47.0 %    MCV 92.3 81.4 - 97.8 fL    MCH 29.8 27.0 - 33.0 pg    MCHC 32.3 (L) 33.6 - 35.0 g/dL    RDW 46.1 35.9 - 50.0 fL    Platelet Count 212 164 - 446 K/uL    MPV 10.3 9.0 - 12.9 fL   T.PALLIDUM AB HUMBERTO (SCREENING)    Collection Time: 03/10/23  1:25 PM   Result Value Ref Range    Syphilis, Treponemal Qual Non-Reactive Non-Reactive   GLUCOSE 3 HR GESTATIONAL    Collection Time: 03/16/23  9:25 AM   Result Value Ref Range    Baseline Glucose 76 65 - 95 mg/dL    Glucose 1 Hour 140 65 - 180 mg/dL    Glucose 2 Hour 111 65 - 155 mg/dL    Glucose 3 Hour 107 65 - 140 mg/dL   GRP B STREP, BY PCR (DAVILA BROTH)    Collection Time: 05/01/23 10:44 AM    Specimen: Genital   Result Value Ref Range    Strep Gp B DNA PCR Negative Negative   PROTEIN/CREAT RATIO URINE    Collection Time: 05/10/23  1:30 PM   Result Value Ref Range    Total Protein, Urine 6.0 0.0 - 15.0 mg/dL    Creatinine, Random Urine 22.38 mg/dL    Protein Creatinine Ratio 268 (H) 10 - 107 mg/g   CBC WITH DIFFERENTIAL    Collection Time: 05/10/23  2:20 PM   Result Value Ref Range    WBC 8.1 4.8 - 10.8 K/uL    RBC 4.16 (L) 4.20 - 5.40 M/uL    Hemoglobin 12.6 12.0 - 16.0 g/dL    Hematocrit 37.8 37.0 - 47.0 %    MCV 90.9 81.4 - 97.8 fL    MCH 30.3 27.0 - 33.0 pg    MCHC 33.3 (L) 33.6 - 35.0 g/dL    RDW 45.8 35.9 - 50.0 fL    Platelet  Count 195 164 - 446 K/uL    MPV 11.1 9.0 - 12.9 fL    Neutrophils-Polys 71.20 44.00 - 72.00 %    Lymphocytes 20.10 (L) 22.00 - 41.00 %    Monocytes 7.90 0.00 - 13.40 %    Eosinophils 0.20 0.00 - 6.90 %    Basophils 0.20 0.00 - 1.80 %    Immature Granulocytes 0.40 0.00 - 0.90 %    Nucleated RBC 0.00 /100 WBC    Neutrophils (Absolute) 5.75 2.00 - 7.15 K/uL    Lymphs (Absolute) 1.63 1.00 - 4.80 K/uL    Monos (Absolute) 0.64 0.00 - 0.85 K/uL    Eos (Absolute) 0.02 0.00 - 0.51 K/uL    Baso (Absolute) 0.02 0.00 - 0.12 K/uL    Immature Granulocytes (abs) 0.03 0.00 - 0.11 K/uL    NRBC (Absolute) 0.00 K/uL   Comp Metabolic Panel    Collection Time: 05/10/23  2:20 PM   Result Value Ref Range    Sodium 137 135 - 145 mmol/L    Potassium 3.9 3.6 - 5.5 mmol/L    Chloride 107 96 - 112 mmol/L    Co2 20 20 - 33 mmol/L    Anion Gap 10.0 7.0 - 16.0    Glucose 72 65 - 99 mg/dL    Bun 8 8 - 22 mg/dL    Creatinine 0.50 0.50 - 1.40 mg/dL    Calcium 8.9 8.5 - 10.5 mg/dL    AST(SGOT) 14 12 - 45 U/L    ALT(SGPT) 7 2 - 50 U/L    Alkaline Phosphatase 175 (H) 30 - 99 U/L    Total Bilirubin 0.3 0.1 - 1.5 mg/dL    Albumin 3.1 (L) 3.2 - 4.9 g/dL    Total Protein 6.6 6.0 - 8.2 g/dL    Globulin 3.5 1.9 - 3.5 g/dL    A-G Ratio 0.9 g/dL   CORRECTED CALCIUM    Collection Time: 05/10/23  2:20 PM   Result Value Ref Range    Correct Calcium 9.6 8.5 - 10.5 mg/dL   ESTIMATED GFR    Collection Time: 05/10/23  2:20 PM   Result Value Ref Range    GFR (CKD-EPI) 121 >60 mL/min/1.73 m 2        Assessment:   1.  IUP at 37w4d  2.  Labor status: Not in labor.  3.  Cat 1 FHTs  4.  Obstetrical history significant for   Patient Active Problem List    Diagnosis Date Noted    LGSIL on Pap smear of cervix, needs repeat 12/2023 12/20/2022    History of delivery of macrosomal infant 12/05/2022    AMA (advanced maternal age) multigravida 35+, third trimester 11/16/2022   .      Plan:     Admit to L&D  GBS negative  Routine labs  Pain management prn  Plan for cytotec or  martha Oquendo, NIYA, APRN

## 2023-05-10 NOTE — PROGRESS NOTES
Pt. Here for OB/FU. Reports a little bit of FM.   Pt. Denies VB / LOF   Chaperone offered and indicated.   Pt states she has been feeling tired and has had contractions since last night and this morning.   GBS negative.   Growth US done on 5/1 - pt would like to know if everything looked okay.   IOL instructions given.            ID : [id number]

## 2023-05-11 LAB
BASOPHILS # BLD AUTO: 0.3 % (ref 0–1.8)
BASOPHILS # BLD: 0.04 K/UL (ref 0–0.12)
EOSINOPHIL # BLD AUTO: 0 K/UL (ref 0–0.51)
EOSINOPHIL NFR BLD: 0 % (ref 0–6.9)
ERYTHROCYTE [DISTWIDTH] IN BLOOD BY AUTOMATED COUNT: 44.5 FL (ref 35.9–50)
ERYTHROCYTE [DISTWIDTH] IN BLOOD BY AUTOMATED COUNT: 44.7 FL (ref 35.9–50)
HCT VFR BLD AUTO: 32.7 % (ref 37–47)
HCT VFR BLD AUTO: 32.9 % (ref 37–47)
HGB BLD-MCNC: 11.2 G/DL (ref 12–16)
HGB BLD-MCNC: 11.2 G/DL (ref 12–16)
IMM GRANULOCYTES # BLD AUTO: 0.07 K/UL (ref 0–0.11)
IMM GRANULOCYTES NFR BLD AUTO: 0.5 % (ref 0–0.9)
LYMPHOCYTES # BLD AUTO: 1.09 K/UL (ref 1–4.8)
LYMPHOCYTES NFR BLD: 8.2 % (ref 22–41)
MCH RBC QN AUTO: 30.4 PG (ref 27–33)
MCH RBC QN AUTO: 30.8 PG (ref 27–33)
MCHC RBC AUTO-ENTMCNC: 34 G/DL (ref 33.6–35)
MCHC RBC AUTO-ENTMCNC: 34.3 G/DL (ref 33.6–35)
MCV RBC AUTO: 89.2 FL (ref 81.4–97.8)
MCV RBC AUTO: 89.8 FL (ref 81.4–97.8)
MONOCYTES # BLD AUTO: 0.79 K/UL (ref 0–0.85)
MONOCYTES NFR BLD AUTO: 5.9 % (ref 0–13.4)
NEUTROPHILS # BLD AUTO: 11.37 K/UL (ref 2–7.15)
NEUTROPHILS NFR BLD: 85.1 % (ref 44–72)
NRBC # BLD AUTO: 0 K/UL
NRBC BLD-RTO: 0 /100 WBC
PLATELET # BLD AUTO: 177 K/UL (ref 164–446)
PLATELET # BLD AUTO: 178 K/UL (ref 164–446)
PMV BLD AUTO: 10.8 FL (ref 9–12.9)
PMV BLD AUTO: 10.9 FL (ref 9–12.9)
RBC # BLD AUTO: 3.64 M/UL (ref 4.2–5.4)
RBC # BLD AUTO: 3.69 M/UL (ref 4.2–5.4)
WBC # BLD AUTO: 10.5 K/UL (ref 4.8–10.8)
WBC # BLD AUTO: 13.4 K/UL (ref 4.8–10.8)

## 2023-05-11 PROCEDURE — 700111 HCHG RX REV CODE 636 W/ 250 OVERRIDE (IP)

## 2023-05-11 PROCEDURE — 304965 HCHG RECOVERY SERVICES

## 2023-05-11 PROCEDURE — 700111 HCHG RX REV CODE 636 W/ 250 OVERRIDE (IP): Performed by: NURSE PRACTITIONER

## 2023-05-11 PROCEDURE — 700102 HCHG RX REV CODE 250 W/ 637 OVERRIDE(OP)

## 2023-05-11 PROCEDURE — 85027 COMPLETE CBC AUTOMATED: CPT

## 2023-05-11 PROCEDURE — A9270 NON-COVERED ITEM OR SERVICE: HCPCS

## 2023-05-11 PROCEDURE — 36415 COLL VENOUS BLD VENIPUNCTURE: CPT

## 2023-05-11 PROCEDURE — 700102 HCHG RX REV CODE 250 W/ 637 OVERRIDE(OP): Performed by: NURSE PRACTITIONER

## 2023-05-11 PROCEDURE — 59409 OBSTETRICAL CARE: CPT | Performed by: NURSE PRACTITIONER

## 2023-05-11 PROCEDURE — A9270 NON-COVERED ITEM OR SERVICE: HCPCS | Performed by: NURSE PRACTITIONER

## 2023-05-11 PROCEDURE — 85025 COMPLETE CBC W/AUTO DIFF WBC: CPT

## 2023-05-11 PROCEDURE — 770002 HCHG ROOM/CARE - OB PRIVATE (112)

## 2023-05-11 PROCEDURE — 59409 OBSTETRICAL CARE: CPT

## 2023-05-11 RX ORDER — MISOPROSTOL 200 UG/1
800 TABLET ORAL ONCE
Status: DISCONTINUED | OUTPATIENT
Start: 2023-05-11 | End: 2023-05-11

## 2023-05-11 RX ORDER — DOCUSATE SODIUM 100 MG/1
100 CAPSULE, LIQUID FILLED ORAL 2 TIMES DAILY PRN
Status: DISCONTINUED | OUTPATIENT
Start: 2023-05-11 | End: 2023-05-12 | Stop reason: HOSPADM

## 2023-05-11 RX ORDER — MISOPROSTOL 200 UG/1
TABLET ORAL
Status: COMPLETED
Start: 2023-05-11 | End: 2023-05-11

## 2023-05-11 RX ORDER — VITAMIN A ACETATE, BETA CAROTENE, ASCORBIC ACID, CHOLECALCIFEROL, .ALPHA.-TOCOPHEROL ACETATE, DL-, THIAMINE MONONITRATE, RIBOFLAVIN, NIACINAMIDE, PYRIDOXINE HYDROCHLORIDE, FOLIC ACID, CYANOCOBALAMIN, CALCIUM CARBONATE, FERROUS FUMARATE, ZINC OXIDE, CUPRIC OXIDE 3080; 12; 120; 400; 1; 1.84; 3; 20; 22; 920; 25; 200; 27; 10; 2 [IU]/1; UG/1; MG/1; [IU]/1; MG/1; MG/1; MG/1; MG/1; MG/1; [IU]/1; MG/1; MG/1; MG/1; MG/1; MG/1
1 TABLET, FILM COATED ORAL
Status: DISCONTINUED | OUTPATIENT
Start: 2023-05-11 | End: 2023-05-12 | Stop reason: HOSPADM

## 2023-05-11 RX ORDER — OXYCODONE HYDROCHLORIDE 10 MG/1
10 TABLET ORAL ONCE
Status: COMPLETED | OUTPATIENT
Start: 2023-05-11 | End: 2023-05-11

## 2023-05-11 RX ORDER — METHYLERGONOVINE MALEATE 0.2 MG/ML
INJECTION INTRAVENOUS
Status: DISCONTINUED
Start: 2023-05-11 | End: 2023-05-11

## 2023-05-11 RX ORDER — IBUPROFEN 800 MG/1
800 TABLET ORAL EVERY 8 HOURS PRN
Status: DISCONTINUED | OUTPATIENT
Start: 2023-05-11 | End: 2023-05-12 | Stop reason: HOSPADM

## 2023-05-11 RX ORDER — ACETAMINOPHEN 500 MG
1000 TABLET ORAL EVERY 6 HOURS PRN
Status: DISCONTINUED | OUTPATIENT
Start: 2023-05-11 | End: 2023-05-12 | Stop reason: HOSPADM

## 2023-05-11 RX ORDER — MISOPROSTOL 200 UG/1
600 TABLET ORAL
Status: DISCONTINUED | OUTPATIENT
Start: 2023-05-11 | End: 2023-05-12 | Stop reason: HOSPADM

## 2023-05-11 RX ORDER — SODIUM CHLORIDE, SODIUM LACTATE, POTASSIUM CHLORIDE, CALCIUM CHLORIDE 600; 310; 30; 20 MG/100ML; MG/100ML; MG/100ML; MG/100ML
INJECTION, SOLUTION INTRAVENOUS PRN
Status: DISCONTINUED | OUTPATIENT
Start: 2023-05-11 | End: 2023-05-12 | Stop reason: HOSPADM

## 2023-05-11 RX ADMIN — OXYCODONE HYDROCHLORIDE 10 MG: 10 TABLET ORAL at 01:26

## 2023-05-11 RX ADMIN — ACETAMINOPHEN 1000 MG: 500 TABLET, FILM COATED ORAL at 08:47

## 2023-05-11 RX ADMIN — MISOPROSTOL 1000 MCG: 200 TABLET ORAL at 01:28

## 2023-05-11 RX ADMIN — FENTANYL CITRATE 100 MCG: 50 INJECTION, SOLUTION INTRAMUSCULAR; INTRAVENOUS at 02:01

## 2023-05-11 RX ADMIN — PRENATAL WITH FERROUS FUM AND FOLIC ACID 1 TABLET: 3080; 920; 120; 400; 22; 1.84; 3; 20; 10; 1; 12; 200; 27; 25; 2 TABLET ORAL at 08:47

## 2023-05-11 RX ADMIN — FENTANYL CITRATE 100 MCG: 50 INJECTION, SOLUTION INTRAMUSCULAR; INTRAVENOUS at 01:27

## 2023-05-11 RX ADMIN — IBUPROFEN 800 MG: 800 TABLET, FILM COATED ORAL at 14:22

## 2023-05-11 RX ADMIN — FENTANYL CITRATE 100 MCG: 50 INJECTION, SOLUTION INTRAMUSCULAR; INTRAVENOUS at 02:02

## 2023-05-11 ASSESSMENT — EDINBURGH POSTNATAL DEPRESSION SCALE (EPDS)
I HAVE BEEN SO UNHAPPY THAT I HAVE HAD DIFFICULTY SLEEPING: NOT AT ALL
THE THOUGHT OF HARMING MYSELF HAS OCCURRED TO ME: NEVER
THINGS HAVE BEEN GETTING ON TOP OF ME: NO, I HAVE BEEN COPING AS WELL AS EVER
I HAVE BLAMED MYSELF UNNECESSARILY WHEN THINGS WENT WRONG: NO, NEVER
I HAVE FELT SAD OR MISERABLE: NO, NOT AT ALL
I HAVE FELT SCARED OR PANICKY FOR NO GOOD REASON: NO, NOT AT ALL
I HAVE LOOKED FORWARD WITH ENJOYMENT TO THINGS: AS MUCH AS I EVER DID
I HAVE BEEN SO UNHAPPY THAT I HAVE BEEN CRYING: NO, NEVER
I HAVE BEEN ANXIOUS OR WORRIED FOR NO GOOD REASON: NO, NOT AT ALL
I HAVE BEEN ABLE TO LAUGH AND SEE THE FUNNY SIDE OF THINGS: AS MUCH AS I ALWAYS COULD

## 2023-05-11 ASSESSMENT — PAIN DESCRIPTION - PAIN TYPE
TYPE: ACUTE PAIN
TYPE: ACUTE PAIN

## 2023-05-11 NOTE — CARE PLAN
The patient is Stable - Low risk of patient condition declining or worsening    Shift Goals  Clinical Goals: Pain control, VSS  Patient Goals: Rest    Progress made toward(s) clinical / shift goals:    Plan of care reviewed with pt, all questions and concerns addressed. Call light within reach, bed in lowest and locked position.    Problem: Pain - Standard  Goal: Alleviation of pain or a reduction in pain to the patient’s comfort goal  Outcome: Progressing     Problem: Knowledge Deficit - Postpartum  Goal: Patient will verbalize and demonstrate understanding of self and infant care  Outcome: Progressing     Problem: Psychosocial - Postpartum  Goal: Patient will verbalize and demonstrate effective bonding and parenting behavior  Outcome: Progressing

## 2023-05-11 NOTE — PROGRESS NOTES
S: Pt is laying in bed with FOB at bedside. She desires an unmedicated birth at this time. She will notify us if she desires pain medication. Baby girl Violetta.     O:    Vitals:    05/10/23 1534 05/10/23 1656 05/10/23 1725 05/10/23 1755   BP: (!) 167/77 (!) 142/81 (!) 150/80 (!) 147/87   Pulse: 70 74 74 73   Resp:       Temp:       TempSrc:       SpO2:       Weight:       Height:               FHTs:  Baseline 150bpm, + accels, no decels, moderate variability        [place]: Contractions q 1-2 minutes, moderate to palpation,         Cook ballon currently placed    A/P:    1.  IUP @ 37w4d   2.  Cat 1 FHTs    3.  AMA, GTHN- IOL, cook ballon currently placed. Will perform repeat SVE PRN. Initiate Pitocin per protocol. Latent labor, Anticiapte     MARKEL De Leon

## 2023-05-11 NOTE — PROGRESS NOTES
-Rcvd report from dayshift RN and assumed care of pt.  -Pitocin started per orders.  -balloon fell out when pt went to the bathroom. SVE=4/50/-2  0-AROM of moderate amount of clear fluid.  010- of viable baby girl with 8/9 apgars  Cytotec given. QBL 800cc.  0237-Will observe pt for another hour for bleeding and BP's. Ordered rcvd for CBC 4 hours from delivery.  0405-Pt up to bathroom to void and adolfo area cleaned.  Pt transferred to  via wheelchair.Report given to Jarett MARQUES

## 2023-05-11 NOTE — PROGRESS NOTES
Cervix 1/50/-3.   FHT Category I  Discussed options of IOL and recommended cooks catheter with potential for pitocin. Patient consented. Cooks catheter placed without difficulty with 60U/40V. Patient tolerated procedure well.   Dr. Dominique Pacheco

## 2023-05-11 NOTE — PROGRESS NOTES
-Rcvd report from daysiban RN and assumed care of pt.  -Pitocin started per orders.  -balloon fell out when pt went to the bathroom. SVE=/-2  -AROM of moderate amount of clear fluid.  102- of viable baby girl with 8/9 apgars

## 2023-05-11 NOTE — PROGRESS NOTES
0715 assumed care. Awake, resting in bed and in no distress. Bed in low position, call light w/in reach. Infant in room, held by FOB and in no distress.

## 2023-05-11 NOTE — PROGRESS NOTES
S: Pt is laying in coping well with contractions, no epidural placed. FOB is at bedside.       O:    Vitals:    05/10/23 1725 05/10/23 1755 05/10/23 1900 05/10/23 2100   BP: (!) 150/80 (!) 147/87 138/80 136/75   Pulse: 74 73 72 74   Resp:       Temp:   36.7 °C (98 °F)    TempSrc:   Temporal    SpO2:       Weight:       Height:               FHTs:  Baseline 135bpm, + accels, no decels, moderate variability        [place]: Contractions q 2-3 minutes, strong to palpation, pitocin @ 3 milliunits        SVE: 5-6/80/0, vertex, soft, anterior. AROM-clear fluid non odorous      A/P:    1.  IUP @ 37w4d   2.  Cat 1 FHTs    3.  GHTN-IOL, active labor,IUPC placed. Start Pitocin infusion per protocol. Anticipate       MARKEL De Leon

## 2023-05-11 NOTE — PROGRESS NOTES
Pt arrived to unit via WC with belongings. All questions and concerns addressed. Pt oriented to room, call light system, and postpartum floor. Bands checked and verified. Pt denies pain at this time. Call light within reach, bed in lowest and locked position.

## 2023-05-11 NOTE — PROGRESS NOTES
Admit Date:   23      Pregnancy Complications: AMA, GHTN, LGA  Medical Induction of Labor: Yes  GBS: Negative  Anesthesia: No  Gestational Age at delivery: 37w5d    The patient was sent to L&D triage from the office due to elevated blood pressures. Patient was admitted and was induced for GHTN. She progressed well with a cook catheter Pitocin, and AROM- clear and non odorous fluid.  She was found to be C/+1  and pushing spontaneously. She delivered a viable female infant in LESTER position at 0102. No nuchal cord noted and infant was placed to maternal abdomen where she was dried and stimulated. A spontaneous cry was noted. Apgar 8, 9.      Pitocin infused via IV bolus. After delayed cord clamping, cord was doubly clamped and cut by FOB. Signs of placenta separation noted and gentle cord traction was completed. Intact placenta was delivered spontaneously at 0105 where 3VC was noted. EBL 300cc. Fundus firm with minimal massage. Inspection of vulva and vagina was completed and found to be intact. Routine postpartum care was initiated as mother and infant stable. Infant weight is 7lb 1.6oz .        MARKEL De Leon

## 2023-05-12 VITALS
BODY MASS INDEX: 41.14 KG/M2 | DIASTOLIC BLOOD PRESSURE: 77 MMHG | SYSTOLIC BLOOD PRESSURE: 127 MMHG | HEIGHT: 66 IN | OXYGEN SATURATION: 98 % | WEIGHT: 256 LBS | TEMPERATURE: 97.4 F | HEART RATE: 76 BPM | RESPIRATION RATE: 19 BRPM

## 2023-05-12 PROCEDURE — 700102 HCHG RX REV CODE 250 W/ 637 OVERRIDE(OP): Performed by: NURSE PRACTITIONER

## 2023-05-12 PROCEDURE — A9270 NON-COVERED ITEM OR SERVICE: HCPCS | Performed by: NURSE PRACTITIONER

## 2023-05-12 RX ADMIN — ACETAMINOPHEN 1000 MG: 500 TABLET, FILM COATED ORAL at 04:03

## 2023-05-12 RX ADMIN — PRENATAL WITH FERROUS FUM AND FOLIC ACID 1 TABLET: 3080; 920; 120; 400; 22; 1.84; 3; 20; 10; 1; 12; 200; 27; 25; 2 TABLET ORAL at 08:47

## 2023-05-12 RX ADMIN — IBUPROFEN 800 MG: 800 TABLET, FILM COATED ORAL at 01:03

## 2023-05-12 ASSESSMENT — PAIN DESCRIPTION - PAIN TYPE: TYPE: ACUTE PAIN

## 2023-05-12 NOTE — PROGRESS NOTES
Report received from Pramod MARQUES. Pt assessment complete. Reviewed POC for this evening. Pt requesting to shower tonight. Supplies brought in for pt. Pt states minimal pain at this time and will call for pain medication as needed. Call light is within reach.

## 2023-05-12 NOTE — DISCHARGE SUMMARY
Discharge Summary:     Date of Admission: 5/10/2023  Date of Discharge: 23      Admitting diagnosis:    1. Pregnancy @ 37w6d    Discharge Diagnosis:   1. Status post vaginal, spontaneous.    History reviewed. No pertinent past medical history.  OB History    Para Term  AB Living   4 3 3     3   SAB IAB Ectopic Molar Multiple Live Births             3      # Outcome Date GA Lbr Jose/2nd Weight Sex Delivery Anes PTL Lv   4 Current            3 Term 05 39w0d  3.629 kg (8 lb) F Vag-Spont None N JORGE      Birth Comments: Pt states no complications.   2 Term 03 39w0d  3.997 kg (8 lb 13 oz) F Vag-Spont None N JORGE      Birth Comments: Pt states no complications.   1 Term 02 39w0d  3.8 kg (8 lb 6 oz) F Vag-Spont None N JORGE      Birth Comments: Pt states complications.     History reviewed. No pertinent surgical history.  Patient has no known allergies.    Patient Active Problem List   Diagnosis    AMA (advanced maternal age) multigravida 35+, third trimester    History of delivery of macrosomal infant    LGSIL on Pap smear of cervix, needs repeat 2023       Hospital Course:   Pt is a 42 y.o. now  who presented on 5/10/2023 for IOL for GHTN. Labor induction performed with Cook's catheter and pitocin. Epidural anesthesia was declined. Single female infant was delivered via  at 0102 hrs. Apgars 8, 9 at 1 and 5 minutes respectively.  ml.     Postpartum course notable for early ambulation, well managed pain, tolerance of diet, spontaneous voiding, and appropriate feeding of infant. She has remained afebrile and blood pressure has been well controlled. All maternal questions and concerns addressed    This morning, patient reports she is feeling well. She denies HA, changes in vision, or RUQ pain. Patient has no concerns and ready to go home.     Physical Exam:  Temp:  [36.3 °C (97.4 °F)-36.9 °C (98.5 °F)] 36.3 °C (97.4 °F)  Pulse:  [76-83] 76  Resp:  [18-19] 19  BP:  (118-127)/(64-77) 127/77  SpO2:  [97 %-98 %] 98 %  Physical Exam  General: well appearing, no apparent distress  Abdomen: soft, nontender, nondistended  Fundus: firm at level below umbilicus  Incision: not applicable, (vaginal delivery)  Perineum: Deferred  Extremities: symmetric, no peripheral edema, calves nontender    Current Facility-Administered Medications   Medication Dose    lactated ringers infusion      docusate sodium (COLACE) capsule 100 mg  100 mg    ibuprofen (MOTRIN) tablet 800 mg  800 mg    acetaminophen (TYLENOL) tablet 1,000 mg  1,000 mg    tetanus-dipth-acell pertussis (Tdap) inj 0.5 mL  0.5 mL    measles, mumps and rubella vaccine (MMR) injection 0.5 mL  0.5 mL    PRN oxytocin (PITOCIN) (20 Units/1000 mL) PRN for excessive uterine bleeding - See Admin Instr  125-999 mL/hr    miSOPROStol (CYTOTEC) tablet 600 mcg  600 mcg    prenatal plus vitamin (STUARTNATAL 1+1) 27-1 MG tablet 1 Tablet  1 Tablet    oxytocin (Pitocin) infusion (for post delivery)  125 mL/hr       Recent Labs     05/10/23  1745 05/11/23  0544 05/11/23  1239   WBC 9.2 13.4* 10.5   RBC 4.21 3.69* 3.64*   HEMOGLOBIN 12.6 11.2* 11.2*   HEMATOCRIT 38.2 32.9* 32.7*   MCV 90.7 89.2 89.8   MCH 29.9 30.4 30.8   MCHC 33.0* 34.0 34.3   RDW 45.4 44.5 44.7   PLATELETCT 193 178 177   MPV 11.3 10.9 10.8         Activity/ Discharge Instructions::   Discharge to home  Pelvic Rest x 6 weeks  No heavy lifting x4 weeks  Call or come to ED for: heavy vaginal bleeding, fever >100.4, severe abdominal pain, severe headache, chest pain, shortness of breath,  N/V, or other concerns.       Follow up:  [facility] in 5 weeks for vaginal delivery.      Maya Reyes M.D.

## 2023-05-12 NOTE — DISCHARGE INSTRUCTIONS
PATIENT DISCHARGE EDUCATION INSTRUCTION SHEET    REASONS TO CALL YOUR OBSTETRICIAN  Persistent fever, shaking, chills (Temperature higher than 100.4) may indicate you have an infection  Heavy bleeding: soaking more than 1 pad per hour; Passing clots an egg-sized clot or bigger may mean you have an postpartum hemorrhage  Foul odor from vagina or bad smelling or discolored discharge or blood  Breast infection (Mastitis symptoms); breast pain, chills, fever, redness or red streaks, may feel flu like symptoms  Urinary pain, burning or frequency  Incision that is not healing, increased redness, swelling, tenderness or pain, or any pus from episiotomy or  site may mean you have an infection  Redness, swelling, warmth, or painful to touch in the calf area of your leg may mean you have a blood clot  Severe or intensified depression, thoughts or feelings of wanting to hurt yourself or someone else   Pain in chest, obstructed breathing or shortness of breath (trouble catching your breath) may mean you are having a postpartum complication. Call your provider immediately   Headache that does not get better, even after taking medicine, a bad headache with vision changes or pain in the upper right area of your belly may mean you have high blood pressure or post birth preeclampsia. Call your provider immediately    HAND WASHING  All family and friends should wash their hands:  Before and after holding the baby  Before feeding the baby  After using the restroom or changing the baby's diaper    WOUND CARE  Ask your physician for additional care instructions. In general:   Incision:  May shower and pat incision dry   Keep the incision clean and dry  There should not be any opening or pus from the incision  Continue to walk at home 3 times a day   Do NOT lift anything heavier than your baby (over 10 pounds)  Encourage family to help participate in care of the  to allow rest and mom time to  heal  Episiotomy/Laceration  May use adolfo-spray bottle, witch hazel pads and dermaplast spray for comfort  Use adolfo-spray bottle after urinating to cleanse perineal area  To prevent burning during urination spray adolfo-water bottle on labial area   Pat perineal area dry until episiotomy/laceration is healed  Continue to use adolfo-bottle until bleeding stops as needed  If have a 2nd degree laceration or greater, a Sitz bath can offer relief from soreness, burning, and inflammation   Sitz Bath   Sit in 6 inches of warm water and soak laceration as needed until the laceration heals    VAGINAL CARE AND BLEEDING  Nothing inside vagina for 6 weeks:   No sexual intercourse, tampons or douching  Bleeding may continue for 2-4 weeks. Amount and color may vary  Soaking 1 pad or more in an hour for several hours is considered heavy bleeding  Passing large egg sized blood clots can be concerning  If you feel like you have heavy bleeding or are having increasing amount of blood clots call your Obstetrician immediately  If you begin feeling faint upon standing, feeling sick to your stomach, have clammy skin, a really fast heartbeat, have chills, start feeling confused, dizzy, sleepy or weak, or feeling like you're going to faint call your Obstetrician immediately    HYPERTENSION   Preeclampsia or gestational hypertension are types of high blood pressure that only pregnant women can get. It is important for you to be aware of symptoms to seek early intervention and treatment. If you have any of these symptoms immediately call your Obstetrician    Vision changes or blurred vision   Severe headache or pain that is unrelieved with medication and will not go away  Persistent pain in upper abdomen or shoulder   Increased swelling of face, feet, or hands  Difficulty breathing or shortness of breath at rest  Urinating less than usual    URINATION AND BOWEL MOVEMENTS  Eating more fiber (bran cereal, fruits, and vegetables) and drinking  "plenty of fluids will help to avoid constipation  Urinary frequency and urgency after childbirth is normal  If you experience any urinary pain, burning or frequency call your provider    BIRTH CONTROL  It is possible to become pregnant at any time after delivery and while breastfeeding  Plan to discuss a method of birth control with your physician at your post delivery follow up visit    POSTPARTUM BLUES  During the first few days after birth, you may experience a sense of the \"blues\" which may include impatience, irritability or even crying. These feelings come and go quickly. However, as many as 1 in 10 women experience emotional symptoms known as postpartum depression.     POSTPARTUM DEPRESSION    May start as early as the second or third day after delivery or take several weeks or months to develop. Symptoms of \"blues\" are present, but are more intense: Crying spells; loss of appetite; feelings of hopelessness or loss of control; fear of touching the baby; over concern or no concern at all about the baby; little or no concern about your own appearance/caring for yourself; and/or inability to sleep or excessive sleeping. Contact your Obstetrician if you are experiencing any of these symptoms     PREVENTING SHAKEN BABY  If you are angry or stressed, PUT THE BABY IN THE CRIB, step away, take some deep breaths, and wait until you are calm to care for the baby. DO NOT SHAKE THE BABY. You are not alone, call a supporter for help.  [facility] 24/7 crisis call line (974-636-5368) or (1-212.291.7431)  You can also text them, text \"ANSWER\" (213054)  DISCHARGE INSTRUCTIONS (Sen[place]e) POSTPARTUM FOR MOM      HANK LAS FAUZIA:  Antes de tocar el bebé.  Antes del amamantamiento o darle al bebé lactancia artificial.  Después de usar el baño.    CUIDADO DE LAS HERIDAS:  La Incisión de la Operación Cesárea:  Mantenga limpea y seca, NO levante nada que pesa más que guerra bebé por 6 semenas.  No debe ninguna apertura ni " pus.  Episiotomía/Renetta Vaginal:  Use un spray de Tucks o Dermoplast, tome un baño de asiento cuando necesite (6 pulgadas), siga usando la botella Lauren hasta que pare de sangrar.    CUIDADA DEL SENO:  Lleve un sostén.  Si esta` amamantando no use jabón en el seno ni en los pezones.  Aplique lanolina si los pezones se ponen quebrazados y si le duelen.    CUIDADO DE LA VAGINA:  [place] puede entrar la vagina por 6 semanas:  Actividad sexual, Ducha vaginal, Tampones.  El sangramiento puede seguir por 2 a 4 semanas.  La cantidad es variable.  Llame a guerra med`ico(a) obstetra si hay mucha laura (si usa ma`s de jaylan toalla femenina cada hora).    CONTRACEPCIÒN:  Es posible embarazarse en cualquier tiempo despus del parto y mientras el amamantamiento.  Debe planear de discutir los metodos de cantracepción con guerra médico(a) cuando vuelva en 6 semanas para guerra revisión médica.    DIETA Y DEFECACÒN:  Para evitar la constipación coma mas fibra (cereal salvado, fruta, y verduras) Y tome muchos liquidos.   Es común orinar frecuentemente después del parto.    POSTPARTO Y LA DEPRESÒN:  April los primeros vaughan después del parto es común sentirse:  Impaciente, irritable, o llorar  Estos sentimientos llegan y van rapidamente.  No obstante, wood jaylan de cada mamta mujeres tiene síntomas emocionales conocidos beto depresión postparto.  Despresión Postparto:  Puede empezar tan pronto beto el luigi o tercer día después del parto o puede durar algunas semanas o meses para desarrollar.  Síntomas de la depresión pueden presentarse, yuly son más intensos:  Pérdida del hambre  Frecuencia de llorar  Sentirse desesperada o perder el control  Demasiada o no suficiente preocupación con guerra bebé  Tener miedo de tocar el bebé  No preocuparse con guerra propia apariencia  Incapacidad de dormir o dormir excesivamente    DEPRESIÒN / RIESGO AL SUICIDIO:    Cuando se le da de shlomo de alguna entidad de [facility], es importante aprender a mantener a eduardo de  hacerse daño a sí mismo.    Reconocer los signos de advertencia:  Cambios bruscos en la peronalidad, positiva o negativa, incluyendo aumento de la energia  Regalar posesiones  Cambios en patrones de comer - significativos cambios de peso - positivos o negativos  Cambio de patrones para dormir - no poder dormir o dormir todo el tiempo  Falta de voluntad o incapacidad de comunicarse  Depresión  Luna, desánimo y tristeza inusual  Habla de querer morir  Descuido del aspecto personal  Rebeldía - comportamiento imprudente  Retiro de personas y actividades que les gusta  Confusión-incapacidad para concentrarse    Si usted o un ser querido observa cualquiera de estos comportamientos o tiene preocupaciones de hacerse daño a si mismo, aquí le damos lo que usted puede hacer:  Hablar de ti - tus sentimientos y razones para hacerse shaheen a si mismo  Retire cualquier medio que se podría utilizar para lastimarse (ejemplos: píldoras, cuerdas, cordones de extensión, arma de geeta)  Obtenga ayuda profesional de la comunidad (betina mental, abuso de sustancias, orientación psicológica)  No estar solo: llamar a un contacto seguro - jaylan persona que confía en que estará allí por usted  Llamada local L´INEA de CRISIS [phone] y [phone]  Llamada local [employer] [facility] [place] [phone] or [email or web address]  Llamada gratuita nacional, líneas de prevención del suicidio  Preventión del Suicidio Nacional Lifeline [phone]-JJDL (5813)  Línea Nacional de la [place] 800-[phone] ([phone])       ([employer])

## 2023-05-12 NOTE — CARE PLAN
Problem: Altered Physiologic Condition  Goal: Patient physiologically stable as evidenced by normal lochia, palpable uterine involution and vitals within normal limits  Outcome: Progressing     Problem: Infection - Postpartum  Goal: Postpartum patient will be free of signs and symptoms of infection  Outcome: Progressing   The patient is Stable - Low risk of patient condition declining or worsening    Shift Goals  Clinical Goals: lochia WDL; pain control  Patient Goals: rest, bond w/ baby  Family Goals: support    Progress made toward(s) clinical / shift goals:  pt lochia is WDL. Pt shows no s/s of infection at this time. Pt has been performing adolfo care with each bathroom use. Pt showered last night.     Patient is not progressing towards the following goals:

## 2023-05-12 NOTE — PROGRESS NOTES
Discharge Summary:     Date of Admission: 5/10/2023  Date of Discharge: 23      Admitting diagnosis:    1. Pregnancy @ 37w6d    Discharge Diagnosis:   1. Status post vaginal, spontaneous.    History reviewed. No pertinent past medical history.  OB History    Para Term  AB Living   4 3 3     3   SAB IAB Ectopic Molar Multiple Live Births             3      # Outcome Date GA Lbr Jose/2nd Weight Sex Delivery Anes PTL Lv   4 Current            3 Term 05 39w0d  3.629 kg (8 lb) F Vag-Spont None N JORGE      Birth Comments: Pt states no complications.   2 Term 03 39w0d  3.997 kg (8 lb 13 oz) F Vag-Spont None N JORGE      Birth Comments: Pt states no complications.   1 Term 02 39w0d  3.8 kg (8 lb 6 oz) F Vag-Spont None N JORGE      Birth Comments: Pt states complications.     History reviewed. No pertinent surgical history.  Patient has no known allergies.    Patient Active Problem List   Diagnosis    AMA (advanced maternal age) multigravida 35+, third trimester    History of delivery of macrosomal infant    LGSIL on Pap smear of cervix, needs repeat 2023       Hospital Course:   Pt is a 42 y.o. now  who presented on 5/10/2023 for IOL for GHTN. Labor induction performed with Cook's catheter and pitocin. Epidural anesthesia was declined. Single female infant was delivered via  at 0102 hrs. Apgars 8, 9 at 1 and 5 minutes respectively.  ml.     Postpartum course notable for early ambulation, well managed pain, tolerance of diet, spontaneous voiding, and appropriate feeding of infant. She has remained afebrile and blood pressure has been well controlled. All maternal questions and concerns addressed    This morning, patient reports she is feeling well. She denies HA, changes in vision, or RUQ pain. Patient has no concerns and ready to go home.     Physical Exam:  Temp:  [36.3 °C (97.4 °F)-36.9 °C (98.5 °F)] 36.3 °C (97.4 °F)  Pulse:  [76-83] 76  Resp:  [18-19] 19  BP:  (118-127)/(64-77) 127/77  SpO2:  [97 %-98 %] 98 %  Physical Exam  General: well appearing, no apparent distress  Abdomen: soft, nontender, nondistended  Fundus: firm at level below umbilicus  Incision: not applicable, (vaginal delivery)  Perineum: Deferred  Extremities: symmetric, no peripheral edema, calves nontender    Current Facility-Administered Medications   Medication Dose    lactated ringers infusion      docusate sodium (COLACE) capsule 100 mg  100 mg    ibuprofen (MOTRIN) tablet 800 mg  800 mg    acetaminophen (TYLENOL) tablet 1,000 mg  1,000 mg    tetanus-dipth-acell pertussis (Tdap) inj 0.5 mL  0.5 mL    measles, mumps and rubella vaccine (MMR) injection 0.5 mL  0.5 mL    PRN oxytocin (PITOCIN) (20 Units/1000 mL) PRN for excessive uterine bleeding - See Admin Instr  125-999 mL/hr    miSOPROStol (CYTOTEC) tablet 600 mcg  600 mcg    prenatal plus vitamin (STUARTNATAL 1+1) 27-1 MG tablet 1 Tablet  1 Tablet    oxytocin (Pitocin) infusion (for post delivery)  125 mL/hr       Recent Labs     05/10/23  1745 05/11/23  0544 05/11/23  1239   WBC 9.2 13.4* 10.5   RBC 4.21 3.69* 3.64*   HEMOGLOBIN 12.6 11.2* 11.2*   HEMATOCRIT 38.2 32.9* 32.7*   MCV 90.7 89.2 89.8   MCH 29.9 30.4 30.8   MCHC 33.0* 34.0 34.3   RDW 45.4 44.5 44.7   PLATELETCT 193 178 177   MPV 11.3 10.9 10.8         Activity/ Discharge Instructions::   Discharge to home  Pelvic Rest x 6 weeks  No heavy lifting x4 weeks  Call or come to ED for: heavy vaginal bleeding, fever >100.4, severe abdominal pain, severe headache, chest pain, shortness of breath,  N/V, or other concerns.       Follow up:  [facility] in 5 weeks for vaginal delivery.      Maya Reyes M.D.

## 2023-05-12 NOTE — PROGRESS NOTES
Pt discharged  with hospital escort. Infant placed in carseat by parent and checked by RN. PT discharge instructions provided approximately 1100 no prescriptions ordered by MD. Checked armbands. Clamp and cuddles removed. No further questions at this time.